# Patient Record
Sex: FEMALE | Race: WHITE | NOT HISPANIC OR LATINO | Employment: OTHER | URBAN - METROPOLITAN AREA
[De-identification: names, ages, dates, MRNs, and addresses within clinical notes are randomized per-mention and may not be internally consistent; named-entity substitution may affect disease eponyms.]

---

## 2018-07-18 ENCOUNTER — OFFICE VISIT CONVERTED (OUTPATIENT)
Dept: ORTHOPEDIC SURGERY | Facility: CLINIC | Age: 78
End: 2018-07-18
Attending: ORTHOPAEDIC SURGERY

## 2019-01-14 ENCOUNTER — OFFICE VISIT CONVERTED (OUTPATIENT)
Dept: ORTHOPEDIC SURGERY | Facility: CLINIC | Age: 79
End: 2019-01-14
Attending: ORTHOPAEDIC SURGERY

## 2019-02-04 ENCOUNTER — OFFICE VISIT CONVERTED (OUTPATIENT)
Dept: ORTHOPEDIC SURGERY | Facility: CLINIC | Age: 79
End: 2019-02-04
Attending: PHYSICIAN ASSISTANT

## 2019-03-12 ENCOUNTER — HOSPITAL ENCOUNTER (OUTPATIENT)
Dept: PREADMISSION TESTING | Facility: HOSPITAL | Age: 79
Discharge: HOME | End: 2019-03-12
Attending: ORTHOPAEDIC SURGERY

## 2019-03-12 LAB
ANION GAP SERPL CALC-SCNC: 14 MMOL/L (ref 8–19)
APTT BLD: 22.7 S (ref 22.2–34.2)
BASOPHILS # BLD AUTO: 0.09 10*3/UL (ref 0–0.2)
BASOPHILS NFR BLD AUTO: 0.9 % (ref 0–3)
BUN SERPL-MCNC: 23 MG/DL (ref 5–25)
BUN/CREAT SERPL: 24 {RATIO} (ref 6–20)
CALCIUM SERPL-MCNC: 10.2 MG/DL (ref 8.7–10.4)
CHLORIDE SERPL-SCNC: 100 MMOL/L (ref 99–111)
CONV ABS IMM GRAN: 0.05 10*3/UL (ref 0–0.2)
CONV CO2: 29 MMOL/L (ref 22–32)
CONV IMMATURE GRAN: 0.5 % (ref 0–1.8)
CREAT UR-MCNC: 0.95 MG/DL (ref 0.5–0.9)
DEPRECATED RDW RBC AUTO: 51.7 FL (ref 36.4–46.3)
EOSINOPHIL # BLD AUTO: 0.13 10*3/UL (ref 0–0.7)
EOSINOPHIL # BLD AUTO: 1.2 % (ref 0–7)
ERYTHROCYTE [DISTWIDTH] IN BLOOD BY AUTOMATED COUNT: 14.6 % (ref 11.7–14.4)
GFR SERPLBLD BASED ON 1.73 SQ M-ARVRAT: 57 ML/MIN/{1.73_M2}
GLUCOSE SERPL-MCNC: 85 MG/DL (ref 65–99)
HBA1C MFR BLD: 13.1 G/DL (ref 12–16)
HCT VFR BLD AUTO: 43 % (ref 37–47)
INR PPP: 0.9 (ref 2–3)
LYMPHOCYTES # BLD AUTO: 1.86 10*3/UL (ref 1–5)
MCH RBC QN AUTO: 29.1 PG (ref 27–31)
MCHC RBC AUTO-ENTMCNC: 30.5 G/DL (ref 33–37)
MCV RBC AUTO: 95.6 FL (ref 81–99)
MONOCYTES # BLD AUTO: 0.69 10*3/UL (ref 0.2–1.2)
MONOCYTES NFR BLD AUTO: 6.6 % (ref 3–10)
NEUTROPHILS # BLD AUTO: 7.69 10*3/UL (ref 2–8)
NEUTROPHILS NFR BLD AUTO: 73.1 % (ref 30–85)
NRBC CBCN: 0 % (ref 0–0.7)
OSMOLALITY SERPL CALC.SUM OF ELEC: 291 MOSM/KG (ref 273–304)
PLATELET # BLD AUTO: 362 10*3/UL (ref 130–400)
PMV BLD AUTO: 10.1 FL (ref 9.4–12.3)
POTASSIUM SERPL-SCNC: 4.4 MMOL/L (ref 3.5–5.3)
PROTHROMBIN TIME: 9.6 S (ref 9.4–12)
RBC # BLD AUTO: 4.5 10*6/UL (ref 4.2–5.4)
SODIUM SERPL-SCNC: 139 MMOL/L (ref 135–147)
VARIANT LYMPHS NFR BLD MANUAL: 17.7 % (ref 20–45)
WBC # BLD AUTO: 10.51 10*3/UL (ref 4.8–10.8)

## 2019-04-08 ENCOUNTER — CONVERSION ENCOUNTER (OUTPATIENT)
Dept: ORTHOPEDIC SURGERY | Facility: CLINIC | Age: 79
End: 2019-04-08

## 2019-04-08 ENCOUNTER — OFFICE VISIT CONVERTED (OUTPATIENT)
Dept: ORTHOPEDIC SURGERY | Facility: CLINIC | Age: 79
End: 2019-04-08
Attending: PHYSICIAN ASSISTANT

## 2019-05-06 ENCOUNTER — CONVERSION ENCOUNTER (OUTPATIENT)
Dept: ORTHOPEDIC SURGERY | Facility: CLINIC | Age: 79
End: 2019-05-06

## 2019-05-06 ENCOUNTER — OFFICE VISIT CONVERTED (OUTPATIENT)
Dept: ORTHOPEDIC SURGERY | Facility: CLINIC | Age: 79
End: 2019-05-06
Attending: PHYSICIAN ASSISTANT

## 2019-05-06 ENCOUNTER — HOSPITAL ENCOUNTER (OUTPATIENT)
Dept: CARDIOLOGY | Facility: HOSPITAL | Age: 79
Discharge: HOME | End: 2019-05-06
Attending: PHYSICIAN ASSISTANT

## 2019-06-10 ENCOUNTER — OFFICE VISIT CONVERTED (OUTPATIENT)
Dept: ORTHOPEDIC SURGERY | Facility: CLINIC | Age: 79
End: 2019-06-10
Attending: PHYSICIAN ASSISTANT

## 2019-07-16 ENCOUNTER — HOSPITAL ENCOUNTER (OUTPATIENT)
Dept: MAMMOGRAPHY | Facility: HOSPITAL | Age: 79
Discharge: HOME | End: 2019-07-16

## 2019-09-16 ENCOUNTER — OFFICE VISIT CONVERTED (OUTPATIENT)
Dept: ORTHOPEDIC SURGERY | Facility: CLINIC | Age: 79
End: 2019-09-16
Attending: ORTHOPAEDIC SURGERY

## 2020-09-14 ENCOUNTER — OFFICE VISIT CONVERTED (OUTPATIENT)
Dept: ORTHOPEDIC SURGERY | Facility: CLINIC | Age: 80
End: 2020-09-14
Attending: PHYSICIAN ASSISTANT

## 2020-09-22 ENCOUNTER — HOSPITAL ENCOUNTER (OUTPATIENT)
Dept: OTHER | Facility: HOSPITAL | Age: 80
Discharge: HOME | End: 2020-09-22
Attending: PHYSICIAN ASSISTANT

## 2020-11-18 ENCOUNTER — OFFICE VISIT CONVERTED (OUTPATIENT)
Dept: ORTHOPEDIC SURGERY | Facility: CLINIC | Age: 80
End: 2020-11-18
Attending: ORTHOPAEDIC SURGERY

## 2020-11-18 ENCOUNTER — CONVERSION ENCOUNTER (OUTPATIENT)
Dept: ORTHOPEDIC SURGERY | Facility: CLINIC | Age: 80
End: 2020-11-18

## 2021-02-03 ENCOUNTER — OFFICE VISIT CONVERTED (OUTPATIENT)
Dept: ORTHOPEDIC SURGERY | Facility: CLINIC | Age: 81
End: 2021-02-03
Attending: PHYSICIAN ASSISTANT

## 2021-02-05 ENCOUNTER — HOSPITAL ENCOUNTER (OUTPATIENT)
Dept: OTHER | Facility: HOSPITAL | Age: 81
Discharge: HOME | End: 2021-02-05
Attending: PHYSICIAN ASSISTANT

## 2021-02-22 ENCOUNTER — HOSPITAL ENCOUNTER (OUTPATIENT)
Dept: VACCINE CLINIC | Facility: HOSPITAL | Age: 81
Discharge: HOME | End: 2021-02-22
Attending: INTERNAL MEDICINE

## 2021-03-03 ENCOUNTER — OFFICE VISIT CONVERTED (OUTPATIENT)
Dept: ORTHOPEDIC SURGERY | Facility: CLINIC | Age: 81
End: 2021-03-03
Attending: ORTHOPAEDIC SURGERY

## 2021-03-08 ENCOUNTER — HOSPITAL ENCOUNTER (OUTPATIENT)
Dept: PREADMISSION TESTING | Facility: HOSPITAL | Age: 81
Discharge: HOME | End: 2021-03-08
Attending: ORTHOPAEDIC SURGERY

## 2021-03-08 LAB
ALBUMIN SERPL-MCNC: 3.8 G/DL (ref 3.5–5)
ALBUMIN/GLOB SERPL: 1.5 {RATIO} (ref 1.4–2.6)
ALP SERPL-CCNC: 73 U/L (ref 43–160)
ALT SERPL-CCNC: 10 U/L (ref 10–40)
ANION GAP SERPL CALC-SCNC: 12 MMOL/L (ref 8–19)
APTT BLD: 22.3 S (ref 22.2–34.2)
AST SERPL-CCNC: 15 U/L (ref 15–50)
BASOPHILS # BLD AUTO: 0.08 10*3/UL (ref 0–0.2)
BASOPHILS NFR BLD AUTO: 1 % (ref 0–3)
BILIRUB SERPL-MCNC: 0.17 MG/DL (ref 0.2–1.3)
BUN SERPL-MCNC: 15 MG/DL (ref 5–25)
BUN/CREAT SERPL: 19 {RATIO} (ref 6–20)
CALCIUM SERPL-MCNC: 9.8 MG/DL (ref 8.7–10.4)
CHLORIDE SERPL-SCNC: 101 MMOL/L (ref 99–111)
CONV ABS IMM GRAN: 0.08 10*3/UL (ref 0–0.2)
CONV CO2: 29 MMOL/L (ref 22–32)
CONV IMMATURE GRAN: 1 % (ref 0–1.8)
CONV TOTAL PROTEIN: 6.3 G/DL (ref 6.3–8.2)
CREAT UR-MCNC: 0.8 MG/DL (ref 0.5–0.9)
DEPRECATED RDW RBC AUTO: 46.9 FL (ref 36.4–46.3)
EOSINOPHIL # BLD AUTO: 0.2 10*3/UL (ref 0–0.7)
EOSINOPHIL # BLD AUTO: 2.5 % (ref 0–7)
ERYTHROCYTE [DISTWIDTH] IN BLOOD BY AUTOMATED COUNT: 13.8 % (ref 11.7–14.4)
EST. AVERAGE GLUCOSE BLD GHB EST-MCNC: 105 MG/DL
GFR SERPLBLD BASED ON 1.73 SQ M-ARVRAT: >60 ML/MIN/{1.73_M2}
GLOBULIN UR ELPH-MCNC: 2.5 G/DL (ref 2–3.5)
GLUCOSE SERPL-MCNC: 105 MG/DL (ref 65–99)
HBA1C MFR BLD: 5.3 % (ref 3.5–5.7)
HCT VFR BLD AUTO: 39.7 % (ref 37–47)
HGB BLD-MCNC: 12.5 G/DL (ref 12–16)
INR PPP: 0.89 (ref 2–3)
LYMPHOCYTES # BLD AUTO: 1.94 10*3/UL (ref 1–5)
LYMPHOCYTES NFR BLD AUTO: 23.8 % (ref 20–45)
MCH RBC QN AUTO: 28.9 PG (ref 27–31)
MCHC RBC AUTO-ENTMCNC: 31.5 G/DL (ref 33–37)
MCV RBC AUTO: 91.7 FL (ref 81–99)
MONOCYTES # BLD AUTO: 0.42 10*3/UL (ref 0.2–1.2)
MONOCYTES NFR BLD AUTO: 5.1 % (ref 3–10)
NEUTROPHILS # BLD AUTO: 5.44 10*3/UL (ref 2–8)
NEUTROPHILS NFR BLD AUTO: 66.6 % (ref 30–85)
NRBC CBCN: 0 % (ref 0–0.7)
OSMOLALITY SERPL CALC.SUM OF ELEC: 287 MOSM/KG (ref 273–304)
PLATELET # BLD AUTO: 302 10*3/UL (ref 130–400)
PMV BLD AUTO: 9.8 FL (ref 9.4–12.3)
POTASSIUM SERPL-SCNC: 3.9 MMOL/L (ref 3.5–5.3)
PROTHROMBIN TIME: 10 S (ref 9.4–12)
RBC # BLD AUTO: 4.33 10*6/UL (ref 4.2–5.4)
SODIUM SERPL-SCNC: 138 MMOL/L (ref 135–147)
WBC # BLD AUTO: 8.16 10*3/UL (ref 4.8–10.8)

## 2021-03-25 ENCOUNTER — HOSPITAL ENCOUNTER (OUTPATIENT)
Dept: VACCINE CLINIC | Facility: HOSPITAL | Age: 81
Discharge: HOME | End: 2021-03-25
Attending: INTERNAL MEDICINE

## 2021-03-25 ENCOUNTER — HOSPITAL ENCOUNTER (OUTPATIENT)
Dept: PREADMISSION TESTING | Facility: HOSPITAL | Age: 81
Discharge: HOME | End: 2021-03-25
Attending: ORTHOPAEDIC SURGERY

## 2021-03-26 LAB — SARS-COV-2 RNA SPEC QL NAA+PROBE: NOT DETECTED

## 2021-04-12 ENCOUNTER — OFFICE VISIT CONVERTED (OUTPATIENT)
Dept: ORTHOPEDIC SURGERY | Facility: CLINIC | Age: 81
End: 2021-04-12

## 2021-04-12 ENCOUNTER — CONVERSION ENCOUNTER (OUTPATIENT)
Dept: ORTHOPEDIC SURGERY | Facility: CLINIC | Age: 81
End: 2021-04-12

## 2021-05-13 NOTE — PROGRESS NOTES
Progress Note      Patient Name: Jayne Crenshaw   Patient ID: [id number]   Sex: Female   YOB: 1940    Referring Provider: Willow Mcknight MD    Visit Date: November 18, 2020    Provider: Marco Antonio Tabares MD   Location: [facility]   Location Address: [address] [place] [address]  [id number]   Location Phone: [phone]          Chief Complaint  · left hip pain      History Of Present Illness  Jayne Crenshaw is a 80 year old /White female who presents today to [facility].      Patient presents today with a follow-up of left hip pain. Patient has a history of right DELVIS 3/26/19 by Dr. Tabares and has no new complaints of her right hip. She has a history of left hip osteoarthritis. Patient got an injection through radiology on 9/22/20 in her left hip. Patient states the injection has given her a significant relief of pain. Patient states that on a scale of 1 to 10, her hip pain is at a 2. She states her hip hasn't been bothering her much since the injection.       Past Medical History  Anemia; Anemia, Unspecified; Arthritis; Bladder Disorder; Depression; High cholesterol; Hyperlipemia; Hypertension; Limb Swelling; Seasonal allergies         Past Surgical History  Appendectomy; Artificial Joints/Limbs; Colonoscopy; Hysterectomy; Joint Surgery; Oral Surgery; Tonsillectomy; Tubal ligation         Medication List  Calcium 600 600 mg (1,500 mg) oral tablet; Desenex 2 % topical powder; fluconazole 200 mg oral tablet; Fosamax 70 mg oral tablet; Lopressor 50 mg oral tablet; Miralax 17 gram/dose oral powder; Norvasc 10 mg oral tablet; Percocet 7.5-325 mg oral tablet; pravastatin 20 mg oral tablet; Zofran 4 mg oral tablet         Allergy List  Biaxin; propofol       Allergies Reconciled  Family Medical History  Heart Disease; Cancer, Unspecified; Diabetes, unspecified type; - No Family History of Colorectal Cancer; Family history of certain chronic disabling  "diseases; arthritis; Family history of Arthritis         Social History  Alcohol (Never); Alcohol Use (Never); Claustophobic (Unknown); lives alone; Recreational Drug Use (Never); Retired.; Tobacco (Never);          Review of Systems  · Constitutional  o Denies  o : fever, chills, weight loss  · Cardiovascular  o Denies  o : chest pain, shortness of breath  · Gastrointestinal  o Denies  o : liver disease, heartburn, nausea, blood in stools  · Genitourinary  o Denies  o : painful urination, blood in urine  · Integument  o Denies  o : rash, itching  · Neurologic  o Denies  o : headache, weakness, loss of consciousness  · Musculoskeletal  o Denies  o : painful, swollen joints  · Psychiatric  o Denies  o : drug/alcohol addiction, anxiety, depression      Vitals  Date Time BP Position Site L\R Cuff Size HR RR TEMP (F) WT  HT  BMI kg/m2 BSA m2 O2 Sat FR L/min FiO2        11/18/2020 09:29 AM         160lbs 0oz 5'  4\" 27.46 1.81             Physical Examination  · Constitutional  o Appearance  o : well developed, well-nourished, no obvious deformities present  · Head and Face  o Head  o :   § Inspection  § : normocephalic  o Face  o :   § Inspection  § : no facial lesions  · Eyes  o Conjunctivae  o : conjunctivae normal  o Sclerae  o : sclerae white  · Ears, Nose, Mouth and Throat  o Ears  o :   § External Ears  § : appearance within normal limits  § Hearing  § : intact  o Nose  o :   § External Nose  § : appearance normal  · Neck  o Inspection/Palpation  o : normal appearance  o Range of Motion  o : full range of motion  · Respiratory  o Respiratory Effort  o : breathing unlabored  o Inspection of Chest  o : normal appearance  o Auscultation of Lungs  o : no audible wheezing or rales  · Cardiovascular  o Heart  o : regular rate  · Gastrointestinal  o Abdominal Examination  o : soft and non-tender  · Skin and Subcutaneous Tissue  o General Inspection  o : intact, no rashes  · Psychiatric  o General  o : Alert and " oriented x3  o Judgement and Insight  o : judgment and insight intact  o Mood and Affect  o : mood normal, affect appropriate  · Left Hip  o Inspection  o : Sensation grossly intact. Neurovascular intact. No obvious deformity. Tender greater trochanter. Good strength in quadriceps, hamstrings, dorsiflexors, and plantar flexors. Skin intact. No swelling or skin discoloration. Near full ROM of hip.   · In Office Procedures  o Comparative Data  o : No comparative data found  · Imaging  o Imaging  o : 9/14/20 In office XRAY: severe advanced degenerative changes of the left hip. bone on bone osteoarthritis.               Assessment  · Primary osteoarthritis of left hip     715.15/M16.10  · Chronic Left Pain: Hip     719.45/M25.559      Plan  · Medications  o Medications have been Reconciled  o Transition of Care or Provider Policy  · Instructions  o Dr. Tabares saw and examined the patient and agrees with plan.   o X-rays reviewed by Dr. Tabares.  o Reviewed the patient's Past Medical, Social, and Family history as well as the ROS at today's visit, no changes.  o Call or return if worsening symptoms.  o Follow Up PRN.  o This note was transcribed by Darlene Baltazar. gordon  o Discussed diagnosis and treatment options with the patient. Patient opted to put off left total hip replacement for as long as she can.            Electronically Signed by: Darlene Baltazar-, Other -Author on November 19, 2020 03:01:51 PM  Electronically Co-signed by: Marco Antonio Tabares MD -Reviewer on November 20, 2020 10:43:51 PM

## 2021-05-13 NOTE — PROGRESS NOTES
Progress Note      Patient Name: Jayne Crenshaw   Patient ID: [id number]   Sex: Female   YOB: 1940    Referring Provider: Willow Mcknight MD    Visit Date: September 14, 2020    Provider: Natalia Sheridan PA-C   Location: [facility]   Location Address: [address] [place] [address]  [id number]   Location Phone: [phone]          Chief Complaint  · right hip pain  · left hip pain      History Of Present Illness  Jayne Crenshaw is a 80 year old /White female who presents today to [facility].      She is s/p right DELVIS 3/26/19 by Dr. Tabares. She is doing well. She has no complaints of right hip pain. She does states left leg symptoms including tightness in her calf and knee discomfort that are reminiscent of right hip symptoms prior to right DELVIS. She was told she had left hip OA in the past.    She also states bilateral thumb pain and numbness of first through fourth digits bilateral hands that wakes her at night.       Past Medical History  Anemia; Anemia, Unspecified; Arthritis; Bladder Disorder; Depression; High cholesterol; Hyperlipemia; Hypertension; Limb Swelling; Seasonal allergies         Past Surgical History  Appendectomy; Artificial Joints/Limbs; Colonoscopy; Hysterectomy; Joint Surgery; Oral Surgery; Tonsillectomy; Tubal ligation         Medication List  Calcium 600 600 mg (1,500 mg) oral tablet; Desenex 2 % topical powder; fluconazole 200 mg oral tablet; Fosamax 70 mg oral tablet; Lopressor 50 mg oral tablet; Miralax 17 gram/dose oral powder; Norvasc 10 mg oral tablet; Percocet 7.5-325 mg oral tablet; pravastatin 20 mg oral tablet; Zofran 4 mg oral tablet         Allergy List  Biaxin; propofol         Family Medical History  Heart Disease; Cancer, Unspecified; Diabetes, unspecified type; - No Family History of Colorectal Cancer; Family history of certain chronic disabling diseases; arthritis; Family history of Arthritis         Social  "History  Alcohol (Never); lives alone; Recreational Drug Use (Never); Retired.; Tobacco (Never);          Review of Systems  · Constitutional  o Denies  o : fever, chills, weight loss  · Cardiovascular  o Denies  o : chest pain, shortness of breath  · Gastrointestinal  o Denies  o : liver disease, heartburn, nausea, blood in stools  · Genitourinary  o Denies  o : painful urination, blood in urine  · Integument  o Denies  o : rash, itching  · Neurologic  o Denies  o : headache, weakness, loss of consciousness  · Musculoskeletal  o Admits  o : painful, swollen joints  · Psychiatric  o Denies  o : drug/alcohol addiction, anxiety, depression      Vitals  Date Time BP Position Site L\R Cuff Size HR RR TEMP (F) WT  HT  BMI kg/m2 BSA m2 O2 Sat        09/14/2020 08:41 AM      71 - R   162lbs 8oz 5'  4\" 27.89 1.82 96 %          Physical Examination  · Constitutional  o Appearance  o : well developed, well-nourished, no obvious deformities present  · Head and Face  o Head  o :   § Inspection  § : normocephalic  o Face  o :   § Inspection  § : no facial lesions  · Eyes  o Conjunctivae  o : conjunctivae normal  o Sclerae  o : sclerae white  · Ears, Nose, Mouth and Throat  o Ears  o :   § External Ears  § : appearance within normal limits  § Hearing  § : intact  o Nose  o :   § External Nose  § : appearance normal  · Neck  o Inspection/Palpation  o : normal appearance  o Range of Motion  o : full range of motion  · Respiratory  o Respiratory Effort  o : breathing unlabored  o Inspection of Chest  o : normal appearance  o Auscultation of Lungs  o : no audible wheezing or rales  · Cardiovascular  o Heart  o : regular rate  · Gastrointestinal  o Abdominal Examination  o : soft and non-tender  · Skin and Subcutaneous Tissue  o General Inspection  o : intact, no rashes  · Psychiatric  o General  o : Alert and oriented x3  o Judgement and Insight  o : judgment and insight intact  o Mood and Affect  o : mood normal, affect " appropriate  · Right Hip  o Inspection  o : Her scar is well-healed. Full hip flexion, internal and external rotation without groin. Calf supple, nontender. Equal strength bilaterally. Neurovascularly and sensation grossly intact.   · Left Hip  o Inspection  o : No obvious deformity. Tender greater trochanter. Full ROM. + pain with figure of 4. Strength equal bilaterally. Neurovascularly intact.   · Extremities  o Extremities  o : BIALTERAL HANDS: mild thenar atrophy, enlarged bilateral first CMC joints. Tender over first dorsal compartment bilaterally. + Finkelstein's bilaterally. Negative CMC grind test bilateral first digit. Negative Tinel's. + Phalen's at bilateral carpal tunnel.   · In Office Procedures  o View  o : AP/LATERAL  o Site  o : right, hip, left hip  o Indication  o : Right hip pain, left hip pain  o Study  o : X-rays ordered, taken in the office, and reviewed today.  o Xray  o : Well aligned, stable implant without signs of loosening. Bone on bone left hip OA.  o Comparative Data  o : Comparative Data found and reviewed today           Assessment  · Primary osteoarthritis of left hip     715.15/M16.12  · Carpal Tunnel Syndrome     354.0/G56.00  · DeQuervains     727.04/M65.4  · Right Pain: Hip     719.45/M25.559  · History of hip replacement, total, right     V43.64/Z96.641      Plan  · Orders  o Hip (Left) 2 or more views (includes AP Pelvis) [facility] Preferred View (17254) - - 09/14/2020  o Hip (Right) 2 or more views (includes AP Pelvis) [facility] Preferred View. (21454) - 719.45/M25.559 - 09/14/2020  · Medications  o Medications have been Reconciled  o Transition of Care or Provider Policy  · Instructions  o Dr. Tabares saw and examined the patient and agrees with plan.   o X-rays reviewed by Dr. Tabares.  o Reviewed the patient's Past Medical, Social, and Family history as well as the ROS at today's visit, no changes.  o Call or return if worsening symptoms.  o Carpal tunnel braces provided. Will proceed with  left hip intra-articular injection, but she will also schedule left DELVIS for January.   o Electronically Identified Patient Education Materials Provided Electronically            Electronically Signed by: TENNILLE Gutierrez-MONICA -Author on September 14, 2020 11:30:55 AM  Electronically Co-signed by: Marco Antonio Tabares MD -Reviewer on September 14, 2020 05:31:53 PM

## 2021-05-14 VITALS — HEART RATE: 68 BPM | OXYGEN SATURATION: 97 % | BODY MASS INDEX: 27.31 KG/M2 | HEIGHT: 64 IN | WEIGHT: 160 LBS

## 2021-05-14 VITALS — WEIGHT: 160 LBS | BODY MASS INDEX: 27.31 KG/M2 | OXYGEN SATURATION: 98 % | HEIGHT: 64 IN | HEART RATE: 70 BPM

## 2021-05-14 VITALS — HEIGHT: 64 IN | WEIGHT: 160 LBS | BODY MASS INDEX: 27.31 KG/M2

## 2021-05-14 VITALS — HEIGHT: 62 IN | HEART RATE: 71 BPM | OXYGEN SATURATION: 95 % | BODY MASS INDEX: 31.74 KG/M2 | WEIGHT: 172.5 LBS

## 2021-05-14 VITALS — HEART RATE: 71 BPM | WEIGHT: 162.5 LBS | BODY MASS INDEX: 27.74 KG/M2 | HEIGHT: 64 IN | OXYGEN SATURATION: 96 %

## 2021-05-14 NOTE — PROGRESS NOTES
Progress Note      Patient Name: Jayne Crenshaw   Patient ID: [id number]   Sex: Female   YOB: 1940    Referring Provider: Willow Mcknight MD    Visit Date: February 3, 2021    Provider: Natalia Sheridan PA-C   Location: [facility]   Location Address: [address] [place] [address]  [id number]   Location Phone: [phone]          Chief Complaint  · Follow up left hip pain      History Of Present Illness  Jayne Crenshaw is a 80 year old /White female who presents today to [facility].      She is here for her left hip. She has bone on bone arthritis. She plans to eventually get her hip replaced, but is holding off to get vaccinated for COVID. She had 3 months of relief with intra-articular injection last year and requests this again.       Past Medical History  Anemia; Anemia, Unspecified; Arthritis; Bladder Disorder; Depression; High cholesterol; Hyperlipemia; Hypertension; Limb Swelling; Seasonal allergies         Past Surgical History  Appendectomy; Artificial Joints/Limbs; Colonoscopy; Hysterectomy; Joint Surgery; Oral Surgery; Tonsillectomy; Tubal ligation         Medication List  Calcium 600 600 mg (1,500 mg) oral tablet; Desenex 2 % topical powder; fluconazole 200 mg oral tablet; Fosamax 70 mg oral tablet; Lopressor 50 mg oral tablet; Miralax 17 gram/dose oral powder; Norvasc 10 mg oral tablet; Percocet 7.5-325 mg oral tablet; pravastatin 20 mg oral tablet; Zofran 4 mg oral tablet         Allergy List  Biaxin; propofol       Allergies Reconciled  Family Medical History  Heart Disease; Cancer, Unspecified; Diabetes, unspecified type; - No Family History of Colorectal Cancer; Family history of certain chronic disabling diseases; arthritis; Family history of Arthritis         Social History  Alcohol (Never); Alcohol Use (Never); Claustophobic (Unknown); lives alone; Recreational Drug Use (Never); Retired.; Tobacco (Never);          Review of  "Systems  · Constitutional  o Denies  o : fever, chills, weight loss  · Cardiovascular  o Denies  o : chest pain, shortness of breath  · Gastrointestinal  o Denies  o : liver disease, heartburn, nausea, blood in stools  · Genitourinary  o Denies  o : painful urination, blood in urine  · Integument  o Denies  o : rash, itching  · Neurologic  o Denies  o : headache, weakness, loss of consciousness  · Musculoskeletal  o Admits  o : painful, swollen joints  · Psychiatric  o Denies  o : drug/alcohol addiction, anxiety, depression      Vitals  Date Time BP Position Site L\R Cuff Size HR RR TEMP (F) WT  HT  BMI kg/m2 BSA m2 O2 Sat FR L/min FiO2 HC       02/03/2021 10:17 AM      70 - R   160lbs 0oz 5'  4\" 27.46 1.81 98 %            Physical Examination  · Constitutional  o Appearance  o : well developed, well-nourished, no obvious deformities present  · Head and Face  o Head  o :   § Inspection  § : normocephalic  o Face  o :   § Inspection  § : no facial lesions  · Eyes  o Conjunctivae  o : conjunctivae normal  o Sclerae  o : sclerae white  · Ears, Nose, Mouth and Throat  o Ears  o :   § External Ears  § : appearance within normal limits  § Hearing  § : intact  o Nose  o :   § External Nose  § : appearance normal  · Neck  o Inspection/Palpation  o : normal appearance  o Range of Motion  o : full range of motion  · Respiratory  o Respiratory Effort  o : breathing unlabored  o Inspection of Chest  o : normal appearance  o Auscultation of Lungs  o : no audible wheezing or rales  · Cardiovascular  o Heart  o : regular rate  · Gastrointestinal  o Abdominal Examination  o : soft and non-tender  · Skin and Subcutaneous Tissue  o General Inspection  o : intact, no rashes  · Psychiatric  o General  o : Alert and oriented x3  o Judgement and Insight  o : judgment and insight intact  o Mood and Affect  o : mood normal, affect appropriate  · Left Hip  o Inspection  o : Sensation grossly intact. Neurovascular intact. No obvious " deformity. Tender greater trochanter. Good strength in quadriceps, hamstrings, dorsiflexors, and plantar flexors. Skin intact. No swelling or skin discoloration. Near full ROM of hip.           Assessment  · Primary osteoarthritis of hip     715.15/M16.10  · Left Pain: Hip     719.45/M25.559      Plan  · Medications  o Medications have been Reconciled  o Transition of Care or Provider Policy  · Instructions  o Reviewed the patient's Past Medical, Social, and Family history as well as the ROS at today's visit, no changes.  o Call or return if worsening symptoms.  o Proceed with left hip intra-articular injection. She will schedule hip replacement no less than 8 weeks out. Follow up 4-6 weeks with Dr. Tabares to discuss anesthesia concerns.   o Electronically Identified Patient Education Materials Provided Electronically            Electronically Signed by: TENNILLE Gutierrez-C -Author on February 3, 2021 12:30:58 PM  Electronically Co-signed by: Marco Antonio Tabares MD -Reviewer on February 4, 2021 08:33:18 PM

## 2021-05-14 NOTE — PROGRESS NOTES
Progress Note      Patient Name: Jayne Crenshaw   Patient ID: [id number]   Sex: Female   YOB: 1940    Referring Provider: Willow Mcknight MD    Visit Date: April 12, 2021    Provider: Jaiden Ryder PA-C   Location: [facility]   Location Address: [address] [place] [address]  [id number]   Location Phone: [phone]          Chief Complaint  · left hip pain      History Of Present Illness  Jayne Crenshaw is a 81 year old /White female who presents today to [facility].      Patient is s/p left total hip arthroplasty performed on 3/30/2021 by Dr. Tabares.  Patient is doing well but has multiple questions today, particularly revolving around her activities and swelling about the left lower extremity.       Past Medical History  Anemia; Anemia, Unspecified; Arthritis; Bladder disorder; Depression; High cholesterol; Hyperlipemia; Hypertension; Limb Swelling; Seasonal allergies         Past Surgical History  Appendectomy; Artificial Joints/Limbs; Colonoscopy; Hysterectomy; Joint Surgery; Oral Surgery; Tonsillectomy; Tubal ligation         Medication List  Calcium 600 600 mg (1,500 mg) oral tablet; fluconazole 200 mg oral tablet; Fosamax 70 mg oral tablet; Lopressor 50 mg oral tablet; Norvasc 10 mg oral tablet; pravastatin 20 mg oral tablet         Allergy List  Biaxin; propofol       Allergies Reconciled  Family Medical History  Heart Disease; Cancer, Unspecified; Diabetes, unspecified type; - No Family History of Colorectal Cancer; Family history of certain chronic disabling diseases; arthritis; Family history of Arthritis         Social History  Alcohol (Never); Alcohol Use (Never); Claustophobic (Unknown); lives alone; Recreational Drug Use (Never); Retired.; Tobacco (Never);          Review of Systems  · Constitutional  o Denies  o : fever, chills, weight loss  · Cardiovascular  o Denies  o : chest pain, shortness of  "breath  · Gastrointestinal  o Denies  o : liver disease, heartburn, nausea, blood in stools  · Genitourinary  o Denies  o : painful urination, blood in urine  · Integument  o Denies  o : rash, itching  · Neurologic  o Denies  o : headache, weakness, loss of consciousness  · Musculoskeletal  o Denies  o : painful, swollen joints  · Psychiatric  o Denies  o : drug/alcohol addiction, anxiety, depression      Vitals  Date Time BP Position Site L\R Cuff Size HR RR TEMP (F) WT  HT  BMI kg/m2 BSA m2 O2 Sat FR L/min FiO2 HC       04/12/2021 01:22 PM      71 - R   172lbs 8oz 5'  2\" 31.55 1.85 95 %            Physical Examination  · Constitutional  o Appearance  o : well developed, well-nourished, no obvious deformities present  · Head and Face  o Head  o :   § Inspection  § : normocephalic  o Face  o :   § Inspection  § : no facial lesions  · Eyes  o Conjunctivae  o : conjunctivae normal  o Sclerae  o : sclerae white  · Ears, Nose, Mouth and Throat  o Ears  o :   § External Ears  § : appearance within normal limits  § Hearing  § : intact  o Nose  o :   § External Nose  § : appearance normal  · Neck  o Inspection/Palpation  o : normal appearance  o Range of Motion  o : full range of motion  · Respiratory  o Respiratory Effort  o : breathing unlabored  o Inspection of Chest  o : normal appearance  o Auscultation of Lungs  o : no audible wheezing or rales  · Cardiovascular  o Heart  o : regular rate  · Gastrointestinal  o Abdominal Examination  o : soft and non-tender  · Skin and Subcutaneous Tissue  o General Inspection  o : intact, no rashes  · Psychiatric  o General  o : Alert and oriented x3  o Judgement and Insight  o : judgment and insight intact  o Mood and Affect  o : mood normal, affect appropriate  · Left Hip  o Inspection  o : Surgical site is clean, dry and intact and be appreciated to be healing appropriately. Left lower extremity demonstrates 3+ edema about the foot and ankle. Otherwise neurovascularly " intact.  · In Office Procedures  o View  o : AP/LATERAL  o Site  o : left, hip  o Indication  o : Left hip pain  o Study  o : X-rays ordered, taken in the office, and reviewed today.  o Xray  o : Demonstrates post-surgical changes of DELVIS with no appreciable loosening or displaced hardware. All other alignments are appreciated via anatomic.   o Comparative Data  o : Comparative Data found and reviewed today          Assessment  · Aftercare;following left total hip arthroplasty     V54.81/Z47.1  · Left Pain: Hip     719.45/M25.559  · Edema of lower extremity     782.3/R60.9      Plan  · Orders  o Hip (Left) 2 or more views (includes AP Pelvis) [facility] Preferred View (25409) - 719.45/M25.559 - 04/12/2021  · Medications  o Medications have been Reconciled  o Transition of Care or Provider Policy  · Instructions  o Reviewed the patient's Past Medical, Social, and Family history as well as the ROS at today's visit, no changes.  o Call or return if worsening symptoms.  o X-ray ordered, taken and reviewed at this visit.  o Reviewed Xrays.  o Follow Up in 4 weeks.   o This note was transcribed by Darlene Baltazar. ch  o Patient is to continue with physical therapy and a new physical therapy order is given today. Patient is advised RICE therapy. Patient is also advised on returning to driving and to the precautions regarding this including her responsibility to operate a vehicle to the standards expected. Patient to follow-up in 4 weeks for further evaluation. Patient understands and agrees with this plan.             Electronically Signed by: Darlene Baltazar-, Other -Author on April 15, 2021 02:06:26 PM  Electronically Co-signed by: Jaiden Ryder PA-C -Reviewer on April 15, 2021 05:41:17 PM  Electronically Co-signed by: Marco Antonio Tabares MD -Reviewer on April 18, 2021 07:58:46 PM

## 2021-05-14 NOTE — PROGRESS NOTES
Progress Note      Patient Name: Jayne Crenshaw   Patient ID: [id number]   Sex: Female   YOB: 1940    Referring Provider: Willow Mcknight MD    Visit Date: March 3, 2021    Provider: Marco Antonio Tabares MD   Location: [facility]   Location Address: [address] [place] [address]  [id number]   Location Phone: [phone]          Chief Complaint  · Left Hip Osteoarthritis      History Of Present Illness  Jayne Crenshaw is a 81 year old /White female who presents today to [facility].      Patient presents today with a follow-up of left hip osteoarthritis. Patient has a history of bone on bone osteoarthritis. She has a history of a right total hip arthroplasty performed on 3/26/19. Patient is scheduled for a left total hip arthroplasty on 3/30/21. 2/5/21 patient received an intra-articular injection that has given her relief. She states some days she has no pain and other days she has a significant amount of left hip pain.       Past Medical History  Anemia; Anemia, Unspecified; Arthritis; Bladder disorder; Depression; High cholesterol; Hyperlipemia; Hypertension; Limb Swelling; Seasonal allergies         Past Surgical History  Appendectomy; Artificial Joints/Limbs; Colonoscopy; Hysterectomy; Joint Surgery; Oral Surgery; Tonsillectomy; Tubal ligation         Medication List  Calcium 600 600 mg (1,500 mg) oral tablet; fluconazole 200 mg oral tablet; Fosamax 70 mg oral tablet; Lopressor 50 mg oral tablet; Norvasc 10 mg oral tablet; pravastatin 20 mg oral tablet         Allergy List  Biaxin; propofol         Family Medical History  Heart Disease; Cancer, Unspecified; Diabetes, unspecified type; - No Family History of Colorectal Cancer; Family history of certain chronic disabling diseases; arthritis; Family history of Arthritis         Social History  Alcohol (Never); Alcohol Use (Never); Claustophobic (Unknown); lives alone; Recreational Drug Use (Never);  "Retired.; Tobacco (Never);          Review of Systems  · Constitutional  o Denies  o : fever, chills, weight loss  · Cardiovascular  o Denies  o : chest pain, shortness of breath  · Gastrointestinal  o Denies  o : liver disease, heartburn, nausea, blood in stools  · Genitourinary  o Denies  o : painful urination, blood in urine  · Integument  o Denies  o : rash, itching  · Neurologic  o Denies  o : headache, weakness, loss of consciousness  · Musculoskeletal  o Denies  o : painful, swollen joints  · Psychiatric  o Denies  o : drug/alcohol addiction, anxiety, depression      Vitals  Date Time BP Position Site L\R Cuff Size HR RR TEMP (F) WT  HT  BMI kg/m2 BSA m2 O2 Sat FR L/min FiO2 HC       03/03/2021 08:50 AM      68 - R   160lbs 0oz 5'  4\" 27.46 1.81 97 %            Physical Examination  · Constitutional  o Appearance  o : well developed, well-nourished, no obvious deformities present  · Head and Face  o Head  o :   § Inspection  § : normocephalic  o Face  o :   § Inspection  § : no facial lesions  · Eyes  o Conjunctivae  o : conjunctivae normal  o Sclerae  o : sclerae white  · Ears, Nose, Mouth and Throat  o Ears  o :   § External Ears  § : appearance within normal limits  § Hearing  § : intact  o Nose  o :   § External Nose  § : appearance normal  · Neck  o Inspection/Palpation  o : normal appearance  o Range of Motion  o : full range of motion  · Respiratory  o Respiratory Effort  o : breathing unlabored  o Inspection of Chest  o : normal appearance  o Auscultation of Lungs  o : no audible wheezing or rales  · Cardiovascular  o Heart  o : regular rate  · Gastrointestinal  o Abdominal Examination  o : soft and non-tender  · Skin and Subcutaneous Tissue  o General Inspection  o : intact, no rashes  · Psychiatric  o General  o : Alert and oriented x3  o Judgement and Insight  o : judgment and insight intact  o Mood and Affect  o : mood normal, affect appropriate  · Left Hip  o Inspection  o : Sensation " grossly intact. Neurovascular intact. Skin intact. No obvious deformity. Tender greater trochanteric. Good strength in quadriceps, hamstrings, dorsiflexors, and plantar flexors. Near full hip range of motion. Good tone of hip flexors, hip extensors, hip adductor, hip abductors. Tender pelvic and hip muscles. Crepitus. Cane for ambulation assistance.           Assessment  · Primary osteoarthritis of left hip     715.15/M16.12      Plan  · Medications  o Medications have been Reconciled  o Transition of Care or Provider Policy  · Instructions  o Dr. Tabares saw and examined the patient and agrees with plan.   o X-rays reviewed by Dr. Tabares.  o Reviewed the patient's Past Medical, Social, and Family history as well as the ROS at today's visit, no changes.  o Call or return if worsening symptoms.  o Discussed surgery.  o Risks/benefits discussed with patient including, but not limited to: infection, bleeding, neurovascular damage, malunion, nonunion, aesthetic deformity, need for further surgery, and death.  o Discussed with patient the implant type being used during surgery and patient understands and desires to proceed.  o Surgery pamphlet given.  o Follow Up post-op.  o Discussed treatment plans with the patient. Patient will continue with left total hip arthroplasty scheduled on 3/30/21.  o The above service was scribed by Darlene Baltazar on my behalf and I attest to the accuracy of the note. [email or web address]  o Electronically Identified Patient Education Materials Provided Electronically            Electronically Signed by: Darlene Baltazar-, Other -Author on March 4, 2021 03:30:38 PM  Electronically Co-signed by: Marco Antonio Tabares MD -Reviewer on March 7, 2021 09:44:01 AM

## 2021-05-15 VITALS — HEART RATE: 69 BPM | WEIGHT: 160 LBS | HEIGHT: 64 IN | OXYGEN SATURATION: 95 % | BODY MASS INDEX: 27.31 KG/M2

## 2021-05-15 VITALS — BODY MASS INDEX: 26.63 KG/M2 | HEIGHT: 64 IN | HEART RATE: 77 BPM | WEIGHT: 156 LBS | OXYGEN SATURATION: 97 %

## 2021-05-15 VITALS — BODY MASS INDEX: 28.21 KG/M2 | WEIGHT: 159.25 LBS | HEART RATE: 58 BPM | OXYGEN SATURATION: 98 % | HEIGHT: 63 IN

## 2021-05-15 VITALS — HEART RATE: 87 BPM | HEIGHT: 64 IN | OXYGEN SATURATION: 95 %

## 2021-05-15 VITALS — WEIGHT: 163 LBS | OXYGEN SATURATION: 97 % | BODY MASS INDEX: 28.88 KG/M2 | HEART RATE: 76 BPM | HEIGHT: 63 IN

## 2021-05-15 VITALS — OXYGEN SATURATION: 96 % | HEIGHT: 64 IN | WEIGHT: 160.37 LBS | BODY MASS INDEX: 27.38 KG/M2 | HEART RATE: 68 BPM

## 2021-05-16 VITALS — OXYGEN SATURATION: 97 % | WEIGHT: 154.37 LBS | HEART RATE: 62 BPM | HEIGHT: 63 IN | BODY MASS INDEX: 27.35 KG/M2

## 2021-05-17 ENCOUNTER — OFFICE VISIT CONVERTED (OUTPATIENT)
Dept: ORTHOPEDIC SURGERY | Facility: CLINIC | Age: 81
End: 2021-05-17
Attending: PHYSICIAN ASSISTANT

## 2021-06-05 NOTE — PROGRESS NOTES
Progress Note      Patient Name: Jayne Crenshaw   Patient ID: [id number]   Sex: Female   YOB: 1940    Referring Provider: Willow Mcknight MD    Visit Date: May 17, 2021    Provider: Jaiden Ryder PA-C   Location: [facility]   Location Address: [address] [place] [address]  [id number]   Location Phone: [phone]          Chief Complaint  · Follow up left total hip arthroplasty      History Of Present Illness  Jayne Crenshaw is a 81 year old /White female who presents today to [facility].      Patient follows up today for left DELVIS performed 3/30/2021 by Dr. Tabares.  Patient reports pain has improved since the time of surgery.  Patient has been adherent to interventions and instructions.       Past Medical History  Anemia; Anemia, Unspecified; Arthritis; Bladder disorder; Depression; High cholesterol; Hyperlipemia; Hypertension; Limb Swelling; Seasonal allergies         Past Surgical History  Appendectomy; Artificial Joints/Limbs; Colonoscopy; Hysterectomy; Joint Surgery; Oral Surgery; Tonsillectomy; Tubal ligation         Medication List  Calcium 600 600 mg (1,500 mg) oral tablet; fluconazole 200 mg oral tablet; Fosamax 70 mg oral tablet; Lopressor 50 mg oral tablet; Norvasc 10 mg oral tablet; pravastatin 20 mg oral tablet         Allergy List  Biaxin; propofol         Family Medical History  Heart Disease; Cancer, Unspecified; Diabetes, unspecified type; - No Family History of Colorectal Cancer; Family history of certain chronic disabling diseases; arthritis; Family history of Arthritis         Social History  Alcohol (Never); Alcohol Use (Never); Claustophobic (Unknown); lives alone; Recreational Drug Use (Never); Retired.; Tobacco (Never);          Review of Systems  · Constitutional  o Denies  o : fever, chills, weight loss  · Cardiovascular  o Denies  o : chest pain, shortness of breath  · Gastrointestinal  o Denies  o : liver  "disease, heartburn, nausea, blood in stools  · Genitourinary  o Denies  o : painful urination, blood in urine  · Integument  o Denies  o : rash, itching  · Neurologic  o Denies  o : headache, weakness, loss of consciousness  · Musculoskeletal  o Denies  o : painful, swollen joints  · Psychiatric  o Denies  o : drug/alcohol addiction, anxiety, depression      Vitals  Date Time BP Position Site L\R Cuff Size HR RR TEMP (F) WT  HT  BMI kg/m2 BSA m2 O2 Sat FR L/min FiO2        05/17/2021 02:18 PM      69 - R   168lbs 0oz 5'  2\" 30.73 1.83 93 %            Physical Examination  · Constitutional  o Appearance  o : well developed, well-nourished, no obvious deformities present  · Head and Face  o Head  o :   § Inspection  § : normocephalic  o Face  o :   § Inspection  § : no facial lesions  · Eyes  o Conjunctivae  o : conjunctivae normal  o Sclerae  o : sclerae white  · Ears, Nose, Mouth and Throat  o Ears  o :   § External Ears  § : appearance within normal limits  § Hearing  § : intact  o Nose  o :   § External Nose  § : appearance normal  · Neck  o Inspection/Palpation  o : normal appearance  o Range of Motion  o : full range of motion  · Respiratory  o Respiratory Effort  o : breathing unlabored  o Inspection of Chest  o : normal appearance  o Auscultation of Lungs  o : no audible wheezing or rales  · Cardiovascular  o Heart  o : regular rate  · Gastrointestinal  o Abdominal Examination  o : soft and non-tender  · Skin and Subcutaneous Tissue  o General Inspection  o : intact, no rashes  · Psychiatric  o General  o : Alert and oriented x3  o Judgement and Insight  o : judgment and insight intact  o Mood and Affect  o : mood normal, affect appropriate  · Left Hip  o Inspection  o : Surgical site is appreciated to be healing appropriately with good scar formation that is supple. Full functional range of motion compared to the contralateral side. Able to transition from sit to  smooth single stage without " assistance. Nonantalgic and stable ambulation with use of cane. Neurovascularly intact distally.          Assessment  · Aftercare following left hip joint replacement surgery       Aftercare following joint replacement surgery     V54.81/Z47.1  Presence of left artificial hip joint     V54.81/Z96.642      Plan  · Instructions  o Reviewed the patient's Past Medical, Social, and Family history as well as the ROS at today's visit, no changes.  o Call or return if worsening symptoms.  o Patient may continue to progressively return to all of her regular activities as discussed in clinic today. Patient is to continue with physical therapy and follow-up in 6 weeks with x-rays performed at that time 2 views left hip.  o Portions of this note were generated with voice recognition software. While efforts have been made to proofread the text, some sound alike errors may still persist.             Electronically Signed by: Jaiden Ryder PA-C -Author on May 17, 2021 05:14:46 PM  Electronically Co-signed by: Marco Antonio Tabares MD -Reviewer on May 18, 2021 04:15:29 PM

## 2021-06-28 ENCOUNTER — OFFICE VISIT (OUTPATIENT)
Dept: ORTHOPEDIC SURGERY | Facility: CLINIC | Age: 81
End: 2021-06-28

## 2021-06-28 VITALS — HEART RATE: 62 BPM | OXYGEN SATURATION: 93 % | BODY MASS INDEX: 30.36 KG/M2 | WEIGHT: 165 LBS | HEIGHT: 62 IN

## 2021-06-28 DIAGNOSIS — M25.552 LEFT HIP PAIN: Primary | ICD-10-CM

## 2021-06-28 DIAGNOSIS — Z47.89 AFTERCARE FOLLOWING SURGERY OF THE MUSCULOSKELETAL SYSTEM: ICD-10-CM

## 2021-06-28 PROCEDURE — 99024 POSTOP FOLLOW-UP VISIT: CPT | Performed by: PHYSICIAN ASSISTANT

## 2021-06-28 RX ORDER — ERGOCALCIFEROL 1.25 MG/1
CAPSULE ORAL
COMMUNITY
Start: 2021-04-10

## 2021-06-28 RX ORDER — AMLODIPINE BESYLATE 10 MG/1
TABLET ORAL
COMMUNITY
Start: 2021-05-29 | End: 2022-12-07

## 2021-06-28 RX ORDER — AMLODIPINE BESYLATE 10 MG/1
TABLET ORAL
COMMUNITY
Start: 2021-03-01

## 2021-06-28 RX ORDER — PRAVASTATIN SODIUM 20 MG
TABLET ORAL
COMMUNITY
Start: 2021-03-01

## 2021-06-28 RX ORDER — METOPROLOL TARTRATE 50 MG/1
TABLET, FILM COATED ORAL
COMMUNITY

## 2021-06-28 RX ORDER — ALENDRONATE SODIUM 70 MG/1
TABLET ORAL
COMMUNITY

## 2021-06-28 NOTE — PROGRESS NOTES
"Chief Complaint  Pain of the Left Hip    Subjective          Jayne Crenshaw presents to [facility] for 3-month postop follow-up for left DELVIS performed by Dr. Tabares 3/30/2021.  Patient states that she has very minimal pain.  She is doing physical therapy and has her last appointment scheduled Wednesday.  She plans to discontinue PT at that point and work on home exercises only.  She is not using a cane or walker today but states she occasionally uses a cane on rough terrain or if she is going to be doing a lot of activity.    Objective   Vital Signs:   Pulse 62   Ht 157.5 cm (62\")   Wt 74.8 kg (165 lb)   SpO2 93%   BMI 30.18 kg/m²       Physical Exam  Constitutional:       Appearance: Normal appearance. He is well-developed and normal weight.   HENT:      Head: Normocephalic.      Right Ear: Hearing and external ear normal.      Left Ear: Hearing and external ear normal.      Nose: Nose normal.   Eyes:      Conjunctiva/sclera: Conjunctivae normal.   Cardiovascular:      Rate and Rhythm: Normal rate.   Pulmonary:      Effort: Pulmonary effort is normal.      Breath sounds: No wheezing or rales.   Abdominal:      Palpations: Abdomen is soft.      Tenderness: There is no abdominal tenderness.   Musculoskeletal:      Cervical back: Normal range of motion.   Skin:     Findings: No rash.   Neurological:      Mental Status: He is alert and oriented to person, place, and time.   Psychiatric:         Mood and Affect: Mood and affect normal.         Judgment: Judgment normal.     Ortho Exam  Left hip: Skin intact with very well-healed surgical incision.  She has full flexion and extension of the left hip.  No pain with internal or external rotation.  No swelling appreciated.  Dorsalis pedis pulses 2+ bilaterally, neurovascularly intact.  Full range of motion left knee ankle and digits on left lower extremity.  Gait is within normal limits, nonantalgic.  Result Review :            Imaging " Results (Most Recent)     Procedure Component Value Units Date/Time    XR Hip With or Without Pelvis 2 - 3 View Left [[id number]] Resulted: 06/28/21 1207     Updated: 06/28/21 1207    Narrative:      X-Ray Report:  Study: X-rays ordered, taken in the office, and reviewed today  Site: Left hip xray  Indication: Pain, postop checkup  View: AP and Lateral view(s)  Findings: Hardware intact, no soft tissue swelling, osseous abnormalities   or malalignment appreciated on this exam.  Prior studies available for comparison: yes                   Assessment and Plan    Problem List Items Addressed This Visit        Musculoskeletal and Injuries    Left hip pain - Primary    Relevant Orders    XR Hip With or Without Pelvis 2 - 3 View Left (Completed)    Aftercare following surgery of the musculoskeletal system    Current Assessment & Plan     Follow-up in 9 months for recheck on left hip replacement at annual checkup.               Follow Up   Return in about 3 months (around 9/28/2021) for Recheck.  Patient Instructions   Follow-up for your annual left hip checkup in 9 months time    Patient was given instructions and counseling regarding her condition or for health maintenance advice. Please see specific information pulled into the AVS if appropriate.

## 2021-07-15 VITALS — HEART RATE: 69 BPM | BODY MASS INDEX: 30.91 KG/M2 | WEIGHT: 168 LBS | HEIGHT: 62 IN | OXYGEN SATURATION: 93 %

## 2021-10-06 ENCOUNTER — TRANSCRIBE ORDERS (OUTPATIENT)
Dept: ADMINISTRATIVE | Facility: HOSPITAL | Age: 81
End: 2021-10-06

## 2021-10-06 DIAGNOSIS — Z78.0 POST-MENOPAUSAL: Primary | ICD-10-CM

## 2022-01-04 ENCOUNTER — APPOINTMENT (OUTPATIENT)
Dept: BONE DENSITY | Facility: HOSPITAL | Age: 82
End: 2022-01-04

## 2022-01-04 ENCOUNTER — HOSPITAL ENCOUNTER (OUTPATIENT)
Dept: BONE DENSITY | Facility: HOSPITAL | Age: 82
Discharge: HOME | End: 2022-01-04
Admitting: FAMILY MEDICINE

## 2022-01-04 DIAGNOSIS — Z78.0 POST-MENOPAUSAL: ICD-10-CM

## 2022-01-04 PROCEDURE — 77080 DXA BONE DENSITY AXIAL: CPT

## 2022-03-28 ENCOUNTER — OFFICE VISIT (OUTPATIENT)
Dept: ORTHOPEDIC SURGERY | Facility: CLINIC | Age: 82
End: 2022-03-28

## 2022-03-28 VITALS — WEIGHT: 166.5 LBS | HEIGHT: 64 IN | BODY MASS INDEX: 28.42 KG/M2

## 2022-03-28 DIAGNOSIS — Z47.89 AFTERCARE FOLLOWING SURGERY OF THE MUSCULOSKELETAL SYSTEM: ICD-10-CM

## 2022-03-28 DIAGNOSIS — M25.552 LEFT HIP PAIN: Primary | ICD-10-CM

## 2022-03-28 PROCEDURE — 99212 OFFICE O/P EST SF 10 MIN: CPT | Performed by: PHYSICIAN ASSISTANT

## 2022-03-28 RX ORDER — AMLODIPINE BESYLATE 10 MG/1
1 TABLET ORAL DAILY
COMMUNITY
Start: 2021-12-07 | End: 2022-12-07

## 2022-03-28 NOTE — PATIENT INSTRUCTIONS
Left hip annual total joint x-rays taken and reviewed today.  Follow-up in 1 year for left hip annual total joint recheck with x-rays at that time.    Right DELVIS follow-up September 2022.

## 2022-03-28 NOTE — PROGRESS NOTES
"Chief Complaint  Follow-up of the Left Hip    Subjective          Jayne Crenshaw presents to [facility] for follow-up on left hip status post left DELVIS by Dr. Tabares 3/30/2021.  Patient states she is doing well and is very happy with her results.  Denies any new injuries.  Still gets swelling of the left lower extremity, denies calf pain.  She states she wears compression socks to help.  She also has a history of right DELVIS in 2019 by Dr. Tabares that is doing well.    Objective   Allergies   Allergen Reactions   • Propofol Swelling   • Clarithromycin Unknown - Low Severity   • Doxylamine Swelling       Vital Signs:   Ht 162.6 cm (64\")   Wt 75.5 kg (166 lb 8 oz)   BMI 28.58 kg/m²       Physical Exam  Constitutional:       Appearance: Normal appearance. Patient is well-developed and normal weight.   HENT:      Head: Normocephalic.      Right Ear: Hearing and external ear normal.      Left Ear: Hearing and external ear normal.      Nose: Nose normal.   Eyes:      Conjunctiva/sclera: Conjunctivae normal.   Cardiovascular:      Rate and Rhythm: Normal rate.   Pulmonary:      Effort: Pulmonary effort is normal.      Breath sounds: No wheezing or rales.   Abdominal:      Palpations: Abdomen is soft.      Tenderness: There is no abdominal tenderness.   Musculoskeletal:      Cervical back: Normal range of motion.   Skin:     Findings: No rash.   Neurological:      Mental Status: Patient is alert and oriented to person, place, and time.   Psychiatric:         Mood and Affect: Mood and affect normal.         Judgment: Judgment normal.     Ortho Exam  Left hip: Skin intact with well-healed surgical incision, no tenderness or swelling, good flexion and abduction, gait nonantalgic, no pain in the groin with internal or external rotation, good range of motion left knee ankle digits, sensation intact in posterior tib pulses 2+  Result Review :            Imaging Results (Most Recent)     Procedure " Component Value Units Date/Time    XR Hip With or Without Pelvis 2 - 3 View Left [[id number]] Resulted: 03/28/22 1020     Updated: 03/28/22 1021    Narrative:      X-Ray Report:  Study: X-rays ordered, taken in the office, and reviewed today  Site: Left hip xray  Indication: Pain  View: AP and Lateral view(s)  Findings: Hardware intact from left DELVIS without failure or loosening, no   malalignment  Prior studies available for comparison: yes                   Assessment and Plan    Problem List Items Addressed This Visit        Musculoskeletal and Injuries    Left hip pain - Primary    Relevant Orders    XR Hip With or Without Pelvis 2 - 3 View Left (Completed)    Aftercare following surgery of the musculoskeletal system    Current Assessment & Plan     Left hip annual total joint x-rays taken and reviewed today.  Follow-up in 1 year for left hip annual total joint recheck with x-rays at that time.    Right DELVIS follow-up September 2022.                 Follow Up   Return in about 1 year (around 3/28/2023) for For annual total joint recheck.  Patient Instructions   Left hip annual total joint x-rays taken and reviewed today.  Follow-up in 1 year for left hip annual total joint recheck with x-rays at that time.    Right DELVIS follow-up September 2022.    Patient was given instructions and counseling regarding her condition or for health maintenance advice. Please see specific information pulled into the AVS if appropriate.

## 2022-09-28 ENCOUNTER — OFFICE VISIT (OUTPATIENT)
Dept: ORTHOPEDIC SURGERY | Facility: CLINIC | Age: 82
End: 2022-09-28

## 2022-09-28 VITALS — WEIGHT: 176 LBS | OXYGEN SATURATION: 98 % | HEIGHT: 64 IN | BODY MASS INDEX: 30.05 KG/M2 | HEART RATE: 76 BPM

## 2022-09-28 DIAGNOSIS — Z96.643 AFTERCARE FOLLOWING BILATERAL HIP JOINT REPLACEMENT SURGERY: ICD-10-CM

## 2022-09-28 DIAGNOSIS — Z47.1 AFTERCARE FOLLOWING BILATERAL HIP JOINT REPLACEMENT SURGERY: ICD-10-CM

## 2022-09-28 DIAGNOSIS — Z47.89 AFTERCARE FOLLOWING SURGERY OF THE MUSCULOSKELETAL SYSTEM: Primary | ICD-10-CM

## 2022-09-28 PROCEDURE — 99213 OFFICE O/P EST LOW 20 MIN: CPT | Performed by: PHYSICIAN ASSISTANT

## 2022-09-28 NOTE — PATIENT INSTRUCTIONS
X-rays reviewed, showing hardware intact. Continue home exercises.     Continue with lifelong antibiotic prophylaxis with dental procedures following total joint replacement.    Follow-up in 1 year. Call sooner, if needed with any changes or concerns. Repeat x-rays.

## 2022-09-28 NOTE — PROGRESS NOTES
"Chief Complaint  Follow-up of bilateral hips    Subjective      Jayne Crenshaw presents to [facility] for follow-up of bilateral total hip arthroplasties.  She underwent right total hip arthroplasty on 3/26/2019 and left total hip arthroplasty on 3/30/2021 by Dr. Tabares.  She presents today for annual follow-up.  She presents independently ambulatory without use of assistive device.  Reports she is doing very well and has returned to usual activities without any complaints of pain.  She does report some residual swelling to the left ankle, for which she wears compression stockings as needed.  She is no longer in physical therapy.    Objective   Allergies   Allergen Reactions   • Propofol Swelling   • Clarithromycin Unknown - Low Severity   • Doxylamine Swelling       Vital Signs:   Pulse 76   Ht 162.6 cm (64\")   Wt 79.8 kg (176 lb)   SpO2 98%   BMI 30.21 kg/m²       Physical Exam    Constitutional: Awake, alert. Well nourished appearance.    Integumentary: Warm, dry, intact. No obvious rashes.    HENT: Atraumatic, normocephalic.   Respiratory: Non labored respirations .   Cardiovascular: Intact peripheral pulses.    Psychiatric: Normal mood and affect. A&O X3    Ortho Exam  Left hip: Well-healed surgical scar noted.  Good strength to hip flexors, extensors, abductors, and adductor's.  No pain with passive internal or external rotation of the hip.  Full knee extension and flexion.  Full plantarflexion and dorsiflexion of the ankle.  Sensation is intact to light touch.  Distal neurovascular intact.    Right hip: Well-healed surgical scar noted.  Good strength to hip flexors, extensors, abductors, and adductor's.  No pain with passive internal or external rotation of the hip.  Full knee flexion and extension.  Full plantarflexion and dorsiflexion of the ankle.  Sensation intact to light touch.  Distal neurovascular intact.    Fully weightbearing with nonantalgic gait " noted.    Imaging Results (Most Recent)     Procedure Component Value Units Date/Time    XR Hips Bilateral With or Without Pelvis 3-4 View [[id number]] Resulted: 09/28/22 1156     Updated: 09/28/22 1159    Narrative:      X-Ray Report:  Study: X-rays ordered, taken in the office, and reviewed today.   Site: Bilateral hip/pelvis Xray  Indication: Bilateral DELVIS  View: AP and Lateral view(s)  Findings: Intact bilateral total hip arthroplasties without evidence of   hardware malfunction or loosening.  Prior studies available for comparison: yes               Assessment and Plan   Problem List Items Addressed This Visit        Musculoskeletal and Injuries    Aftercare following surgery of the musculoskeletal system - Primary    Relevant Orders    XR Hips Bilateral With or Without Pelvis 3-4 View (Completed)      Other Visit Diagnoses     Aftercare following bilateral hip joint replacement surgery            Follow Up   Return in about 1 year (around 9/28/2023).    Patient Instructions   X-rays reviewed, showing hardware intact. Continue home exercises.     Continue with lifelong antibiotic prophylaxis with dental procedures following total joint replacement.    Follow-up in 1 year. Call sooner, if needed with any changes or concerns. Repeat x-rays.       Patient was given instructions and counseling regarding her condition or for health maintenance advice. Please see specific information pulled into the AVS if appropriate.

## 2022-10-19 ENCOUNTER — HOSPITAL ENCOUNTER (EMERGENCY)
Facility: HOSPITAL | Age: 82
Discharge: HOME | End: 2022-10-19
Attending: EMERGENCY MEDICINE | Admitting: EMERGENCY MEDICINE

## 2022-10-19 VITALS
DIASTOLIC BLOOD PRESSURE: 88 MMHG | WEIGHT: 166.23 LBS | TEMPERATURE: 98.8 F | BODY MASS INDEX: 29.45 KG/M2 | OXYGEN SATURATION: 96 % | SYSTOLIC BLOOD PRESSURE: 159 MMHG | HEIGHT: 63 IN | RESPIRATION RATE: 20 BRPM | HEART RATE: 82 BPM

## 2022-10-19 DIAGNOSIS — S80.12XA CONTUSION OF LEFT LOWER LEG, INITIAL ENCOUNTER: Primary | ICD-10-CM

## 2022-10-19 LAB
ALBUMIN SERPL-MCNC: 4.5 G/DL (ref 3.5–5.2)
ALBUMIN/GLOB SERPL: 1.7 G/DL
ALP SERPL-CCNC: 76 U/L (ref 39–117)
ALT SERPL W P-5'-P-CCNC: 13 U/L (ref 1–33)
ANION GAP SERPL CALCULATED.3IONS-SCNC: 9.5 MMOL/L (ref 5–15)
AST SERPL-CCNC: 17 U/L (ref 1–32)
BASOPHILS # BLD AUTO: 0.1 10*3/MM3 (ref 0–0.2)
BASOPHILS NFR BLD AUTO: 1 % (ref 0–1.5)
BILIRUB SERPL-MCNC: 0.3 MG/DL (ref 0–1.2)
BUN SERPL-MCNC: 16 MG/DL (ref 8–23)
BUN/CREAT SERPL: 15.5 (ref 7–25)
CALCIUM SPEC-SCNC: 9.2 MG/DL (ref 8.6–10.5)
CHLORIDE SERPL-SCNC: 102 MMOL/L (ref 98–107)
CO2 SERPL-SCNC: 27.5 MMOL/L (ref 22–29)
CREAT SERPL-MCNC: 1.03 MG/DL (ref 0.57–1)
DEPRECATED RDW RBC AUTO: 48.1 FL (ref 37–54)
EGFRCR SERPLBLD CKD-EPI 2021: 54.1 ML/MIN/1.73
EOSINOPHIL # BLD AUTO: 0.15 10*3/MM3 (ref 0–0.4)
EOSINOPHIL NFR BLD AUTO: 1.5 % (ref 0.3–6.2)
ERYTHROCYTE [DISTWIDTH] IN BLOOD BY AUTOMATED COUNT: 14.3 % (ref 12.3–15.4)
GLOBULIN UR ELPH-MCNC: 2.6 GM/DL
GLUCOSE SERPL-MCNC: 123 MG/DL (ref 65–99)
HCT VFR BLD AUTO: 41.8 % (ref 34–46.6)
HGB BLD-MCNC: 13.3 G/DL (ref 12–15.9)
HOLD SPECIMEN: NORMAL
HOLD SPECIMEN: NORMAL
IMM GRANULOCYTES # BLD AUTO: 0.03 10*3/MM3 (ref 0–0.05)
IMM GRANULOCYTES NFR BLD AUTO: 0.3 % (ref 0–0.5)
LYMPHOCYTES # BLD AUTO: 3.4 10*3/MM3 (ref 0.7–3.1)
LYMPHOCYTES NFR BLD AUTO: 33.4 % (ref 19.6–45.3)
MCH RBC QN AUTO: 29.3 PG (ref 26.6–33)
MCHC RBC AUTO-ENTMCNC: 31.8 G/DL (ref 31.5–35.7)
MCV RBC AUTO: 92.1 FL (ref 79–97)
MONOCYTES # BLD AUTO: 0.5 10*3/MM3 (ref 0.1–0.9)
MONOCYTES NFR BLD AUTO: 4.9 % (ref 5–12)
NEUTROPHILS NFR BLD AUTO: 58.9 % (ref 42.7–76)
NEUTROPHILS NFR BLD AUTO: 6 10*3/MM3 (ref 1.7–7)
NRBC BLD AUTO-RTO: 0 /100 WBC (ref 0–0.2)
PLATELET # BLD AUTO: 307 10*3/MM3 (ref 140–450)
PMV BLD AUTO: 9.7 FL (ref 6–12)
POTASSIUM SERPL-SCNC: 4.1 MMOL/L (ref 3.5–5.2)
PROT SERPL-MCNC: 7.1 G/DL (ref 6–8.5)
RBC # BLD AUTO: 4.54 10*6/MM3 (ref 3.77–5.28)
SODIUM SERPL-SCNC: 139 MMOL/L (ref 136–145)
WBC NRBC COR # BLD: 10.18 10*3/MM3 (ref 3.4–10.8)
WHOLE BLOOD HOLD COAG: NORMAL
WHOLE BLOOD HOLD SPECIMEN: NORMAL

## 2022-10-19 PROCEDURE — 36415 COLL VENOUS BLD VENIPUNCTURE: CPT

## 2022-10-19 PROCEDURE — 80053 COMPREHEN METABOLIC PANEL: CPT | Performed by: EMERGENCY MEDICINE

## 2022-10-19 PROCEDURE — 99282 EMERGENCY DEPT VISIT SF MDM: CPT

## 2022-10-19 PROCEDURE — 85025 COMPLETE CBC W/AUTO DIFF WBC: CPT

## 2022-10-19 NOTE — ED TRIAGE NOTES
Pt to ED from home with reports of red knot to left lower leg since waking up this morning.  Pt states she went to bed at 10pm and leg was normal.      Pt states she has chronic swelling to LLE s/p his surgery two years ago.  Pt denies history of a fib and doesn't take blood thinners.

## 2022-10-19 NOTE — ED PROVIDER NOTES
Time: 1:17 AM EDT  Arrived by: private car  Chief Complaint:   Chief Complaint   Patient presents with   • Leg Swelling     History provided by: patient  History is limited by: N/A     History of Present Illness:      Patient is a 82 y.o. year old female that presents to the emergency department with L leg swelling/pain. Pt states she went to bed at 10 PM and her leg was normal, but woke up during the night with leg tenderness. Pt reports chronic, intermittent swelling in L leg since L hip surgery two years ago. Pt denies nausea, vomiting, abdominal pain, fever, chills, breathing issues, chest pain, cough, and congestion.       History provided by:  Patient   used: No        Similar Symptoms Previously: N/A  Recently seen: N/A      Patient Care Team  Primary Care Provider: Willow Mcknight MD    Past Medical History:     Allergies   Allergen Reactions   • Propofol Swelling   • Clarithromycin Unknown - Low Severity   • Doxylamine Swelling     Past Medical History:   Diagnosis Date   • Anemia    • Arthritis    • Bladder disorder    • Depression    • High cholesterol    • Hyperlipemia    • Hypertension    • Limb swelling    • Seasonal allergies      Past Surgical History:   Procedure Laterality Date   • APPENDECTOMY     • COLONOSCOPY  1990   • ESSURE TUBAL LIGATION     • HIP SURGERY     • HYSTERECTOMY     • JOINT REPLACEMENT     • MOUTH SURGERY     • OTHER SURGICAL HISTORY      ARTIFICAL JOINTS/ LIMBS    • OTHER SURGICAL HISTORY      JOINT SURGERY   • TONSILLECTOMY       Family History   Problem Relation Age of Onset   • Heart disease Mother    • Diabetes Mother    • Arthritis Mother    • Heart disease Father    • Cancer Daughter        Home Medications:  Prior to Admission medications    Medication Sig Start Date End Date Taking? Authorizing Provider   alendronate (FOSAMAX) 70 MG tablet Fosamax 70 mg oral tablet take 1 tablet (70 mg) by oral route once weekly in the morning, at least 30  min before first food, beverage, or medication of day   Active    Ana Maria Perry MD   amLODIPine (NORVASC) 10 MG tablet Norvasc 10 mg oral tablet take 1 tablet (10 mg) by oral route once daily   Active 3/1/21   Ana Maria Perry MD   amLODIPine (NORVASC) 10 MG tablet  5/29/21   Ana Maria Perry MD   amLODIPine (NORVASC) 10 MG tablet Take 1 tablet by mouth Daily. 12/7/21   Ana Maria Perry MD   calcium carbonate (MAALOX) 600 MG chewable tablet Chew 600 mg.    Ana Maria Perry MD   Calcium Carbonate 1500 (600 Ca) MG tablet Calcium 600 600 mg (1,500 mg) oral tablet take 1 tablet by oral route 2 times a day   Active    Ana Maria Perry MD   metoprolol tartrate (LOPRESSOR) 50 MG tablet Lopressor 50 mg oral tablet take 1 tablet (50 mg) by oral route 2 times per day with meals   Active    Ana Maria Perry MD   pravastatin (PRAVACHOL) 20 MG tablet pravastatin 20 mg oral tablet take 1 tablet (20 mg) by oral route once daily   Active 3/1/21   Ana Maria Perry MD   vitamin D (ERGOCALCIFEROL) 1.25 MG (70374 UT) capsule capsule TAKE 1 CAPSULE BY MOUTH EVERY MONDAY 4/10/21   Ana Maria Perry MD        Social History:   Social History     Tobacco Use   • Smoking status: Never   • Smokeless tobacco: Never   Substance Use Topics   • Alcohol use: Never   • Drug use: Never     Recent travel: not applicable     Review of Systems:  Review of Systems   Constitutional: Negative for chills and fever.   HENT: Negative for congestion, ear pain and sore throat.    Eyes: Negative for pain.   Respiratory: Negative for cough, chest tightness and shortness of breath.    Cardiovascular: Positive for leg swelling (with tenderness). Negative for chest pain.   Gastrointestinal: Negative for abdominal pain, diarrhea, nausea and vomiting.   Genitourinary: Negative for flank pain and hematuria.   Musculoskeletal: Negative for joint swelling.   Skin: Negative for pallor.   Neurological: Negative for seizures  "and headaches.        Physical Exam:  /88 (BP Location: Left arm, Patient Position: Sitting)   Pulse 82   Temp 98.8 °F (37.1 °C) (Oral)   Resp 20   Ht 160 cm (63\")   Wt 75.4 kg (166 lb 3.6 oz)   SpO2 96%   BMI 29.45 kg/m²     Physical Exam  Vitals and nursing note reviewed.   Constitutional:       General: She is not in acute distress.     Appearance: Normal appearance. She is not toxic-appearing.   HENT:      Head: Normocephalic and atraumatic.      Nose: Nose normal.      Mouth/Throat:      Mouth: Mucous membranes are moist.   Eyes:      General: No scleral icterus.     Extraocular Movements: Extraocular movements intact.      Conjunctiva/sclera: Conjunctivae normal.      Pupils: Pupils are equal, round, and reactive to light.   Cardiovascular:      Rate and Rhythm: Normal rate and regular rhythm.      Pulses: Normal pulses.      Heart sounds: Normal heart sounds.   Pulmonary:      Effort: Pulmonary effort is normal. No respiratory distress.      Breath sounds: Normal breath sounds.   Abdominal:      General: Abdomen is flat. There is no distension.      Palpations: Abdomen is soft.      Tenderness: There is no abdominal tenderness.   Musculoskeletal:         General: Normal range of motion.      Cervical back: Normal range of motion and neck supple.      Left lower leg: Tenderness present.      Comments: Small area of tenderness with mild erythema to lower shin on left. No lower extremity edema. Leg is neurovascularly intact. No tenderness over calf   Skin:     General: Skin is warm and dry.      Capillary Refill: Capillary refill takes less than 2 seconds.   Neurological:      General: No focal deficit present.      Mental Status: She is alert and oriented to person, place, and time. Mental status is at baseline.   Psychiatric:         Mood and Affect: Mood normal.         Behavior: Behavior normal.                Medications in the Emergency Department:  Medications - No data to display "     Labs  Lab Results (last 24 hours)     Procedure Component Value Units Date/Time    CBC & Differential [[id number]]  (Abnormal) Collected: 10/19/22 0055    Specimen: Blood Updated: 10/19/22 0106    Narrative:      The following orders were created for panel order CBC & Differential.  Procedure                               Abnormality         Status                     ---------                               -----------         ------                     CBC Auto Differential[[id number]]        Abnormal            Final result                 Please view results for these tests on the individual orders.    Comprehensive Metabolic Panel [[id number]]  (Abnormal) Collected: 10/19/22 0055    Specimen: Blood Updated: 10/19/22 0121     Glucose 123 mg/dL      BUN 16 mg/dL      Creatinine 1.03 mg/dL      Sodium 139 mmol/L      Potassium 4.1 mmol/L      Chloride 102 mmol/L      CO2 27.5 mmol/L      Calcium 9.2 mg/dL      Total Protein 7.1 g/dL      Albumin 4.50 g/dL      ALT (SGPT) 13 U/L      AST (SGOT) 17 U/L      Alkaline Phosphatase 76 U/L      Total Bilirubin 0.3 mg/dL      Globulin 2.6 gm/dL      A/G Ratio 1.7 g/dL      BUN/Creatinine Ratio 15.5     Anion Gap 9.5 mmol/L      eGFR 54.1 mL/min/1.73      Comment: National Kidney Foundation and American Society of Nephrology (ASN) Task Force recommended calculation based on the Chronic Kidney Disease Epidemiology Collaboration (CKD-EPI) equation refit without adjustment for race.       Narrative:      GFR Normal >60  Chronic Kidney Disease <60  Kidney Failure <15      CBC Auto Differential [[id number]]  (Abnormal) Collected: 10/19/22 0055    Specimen: Blood Updated: 10/19/22 0106     WBC 10.18 10*3/mm3      RBC 4.54 10*6/mm3      Hemoglobin 13.3 g/dL      Hematocrit 41.8 %      MCV 92.1 fL      MCH 29.3 pg      MCHC 31.8 g/dL      RDW 14.3 %      RDW-SD 48.1 fl      MPV 9.7 fL      Platelets 307 10*3/mm3      Neutrophil % 58.9 %      Lymphocyte % 33.4 %      Monocyte % 4.9  %      Eosinophil % 1.5 %      Basophil % 1.0 %      Immature Grans % 0.3 %      Neutrophils, Absolute 6.00 10*3/mm3      Lymphocytes, Absolute 3.40 10*3/mm3      Monocytes, Absolute 0.50 10*3/mm3      Eosinophils, Absolute 0.15 10*3/mm3      Basophils, Absolute 0.10 10*3/mm3      Immature Grans, Absolute 0.03 10*3/mm3      nRBC 0.0 /100 WBC            Imaging:  No Radiology Exams Resulted Within Past 24 Hours    Procedures:  Procedures    Progress                            Medical Decision Making:  MDM  Number of Diagnoses or Management Options  Contusion of left lower leg, initial encounter  Diagnosis management comments: Patient is afebrile nontoxic-appearing.  Vital signs are stable.  At time of evaluation she is lying in bed in no acute distress.  Patient presented to the emergency department for small tender area over her left anterior shin.  Patient was concerned for possible DVT.  She has no swelling of her lower extremity.  No tenderness over her calf or thigh.  Presentation more consistent with a small contusion.  Initial blood pressure was elevated.  Repeat improved without intervention.  I recommend she follows up with her primary physician.  Discussed return precautions, discharge instructions and answered all her questions.         Amount and/or Complexity of Data Reviewed  Clinical lab tests: ordered and reviewed    Risk of Complications, Morbidity, and/or Mortality  Presenting problems: low  Management options: low    Patient Progress  Patient progress: stable       Final diagnoses:   Contusion of left lower leg, initial encounter        Disposition:  ED Disposition     ED Disposition   Discharge    Condition   Stable    Comment   --             This medical record created using voice recognition software and may contain unintended errors.    Documentation assistance provided by Kandy Acevedo acting as scribe for Karina Zimmerman MD. Information recorded by the scribe was done at my direction  and has been verified and validated by me.          Kandy Acevedo  10/19/22 0128       Karina Zimmerman MD  10/19/22 4108

## 2023-03-27 ENCOUNTER — OFFICE VISIT (OUTPATIENT)
Dept: ORTHOPEDIC SURGERY | Facility: CLINIC | Age: 83
End: 2023-03-27
Payer: MEDICARE

## 2023-03-27 VITALS — BODY MASS INDEX: 29.41 KG/M2 | HEIGHT: 63 IN | WEIGHT: 166 LBS

## 2023-03-27 DIAGNOSIS — Z96.643 AFTERCARE FOLLOWING BILATERAL HIP JOINT REPLACEMENT SURGERY: Primary | ICD-10-CM

## 2023-03-27 DIAGNOSIS — Z96.643 HISTORY OF BILATERAL TOTAL HIP ARTHROPLASTY: ICD-10-CM

## 2023-03-27 DIAGNOSIS — Z47.1 AFTERCARE FOLLOWING BILATERAL HIP JOINT REPLACEMENT SURGERY: Primary | ICD-10-CM

## 2023-03-27 PROCEDURE — 1160F RVW MEDS BY RX/DR IN RCRD: CPT | Performed by: PHYSICIAN ASSISTANT

## 2023-03-27 PROCEDURE — 99213 OFFICE O/P EST LOW 20 MIN: CPT | Performed by: PHYSICIAN ASSISTANT

## 2023-03-27 PROCEDURE — 1159F MED LIST DOCD IN RCRD: CPT | Performed by: PHYSICIAN ASSISTANT

## 2023-03-27 NOTE — PATIENT INSTRUCTIONS
X-rays reviewed, showing hardware intact. Continue home exercise program to progress strength and ROM.     Continue with lifelong antibiotic prophylaxis with dental procedures following total joint replacement.    Follow-up in 12 months. Call sooner, if needed with any changes or concerns. Repeat x-rays.

## 2023-03-27 NOTE — PROGRESS NOTES
"Chief Complaint  Follow-up and Pain of the Left Hip and Pain and Follow-up of the Right Hip    Subjective      Jayne Crenshaw presents to [facility] for a follow up for bilateral hip pain. She underwent right hip arthroplasty done on 03/26/2019 and her left hip arthroplasty done on 03/30/2021 performed by Dr. Tabares. She reports more swelling to her left leg but reports her compression socks have helped. She is ambulating today without any assistive devices today. She is overall happy with how her hips are doing. She reports she has been able to return to all her normal activities without having any pain. She reports no groin pain. She reports no falls or injury since her last visit.     Objective   Allergies   Allergen Reactions   • Propofol Swelling   • Clarithromycin Unknown - Low Severity   • Doxylamine Swelling       Vital Signs:   Ht 160 cm (63\")   Wt 75.3 kg (166 lb)   BMI 29.41 kg/m²       Physical Exam    Constitutional: Awake, alert. Well nourished appearance.    Integumentary: Warm, dry, intact. No obvious rashes.    HENT: Atraumatic, normocephalic.   Respiratory: Non labored respirations .   Cardiovascular: Intact peripheral pulses.    Psychiatric: Normal mood and affect. A&O X3    Ortho Exam  Left lower extremity: well healed surgical incision, skin is warm, dry and intact, good strength to the hip flexors, extensors, abductors and adductors, full knee extension and flexion, non tender to palpation to her left greater trochanter, neurovascularly intact, calf soft, positive EHL, FHL, GS, and TA. Sensation intact to all 5 nerves of the foot.      Right lower extremity: well healed surgical incision, skin is warm, dry and intact, good strength to the hip flexors, extensors, abductors and adductors, full knee extension and flexion, non tender to palpation to her right greater trochanter, neurovascularly intact, calf soft, positive EHL, FHL, GS, and TA. Sensation intact to " all 5 nerves of the foot.      Imaging:   X-Ray Report:  Study: X-rays ordered, taken in the office, and reviewed today.   Site: Bilateral hips/pelvis Xray  Indication: THAs  View: AP/Lateral view(s)  Findings: Intact left total hip arthroplasties noted. No evidence of hardware malfunction or loosening, subsidence, or periprosthetic fracture.   Prior studies available for comparison: yes            Assessment and Plan   Problem List Items Addressed This Visit    None  Visit Diagnoses     Aftercare following bilateral hip joint replacement surgery    -  Primary    Relevant Orders    XR Hips Bilateral With or Without Pelvis 3-4 View (Completed)    History of bilateral total hip arthroplasty            Follow Up   Return in about 1 year (around 3/27/2024).  Patient is a non-smoker.  Did not discuss options for smoking cessation.    Patient Instructions   X-rays reviewed, showing hardware intact. Continue home exercise program to progress strength and ROM.     Continue with lifelong antibiotic prophylaxis with dental procedures following total joint replacement.    Follow-up in 12 months. Call sooner, if needed with any changes or concerns. Repeat x-rays.       Patient was given instructions and counseling regarding her condition or for health maintenance advice. Please see specific information pulled into the AVS if appropriate.

## 2023-10-02 ENCOUNTER — OFFICE VISIT (OUTPATIENT)
Dept: ORTHOPEDIC SURGERY | Facility: CLINIC | Age: 83
End: 2023-10-02
Payer: MEDICARE

## 2023-10-02 VITALS — WEIGHT: 162 LBS | HEIGHT: 63 IN | BODY MASS INDEX: 28.7 KG/M2

## 2023-10-02 DIAGNOSIS — Z96.643 HISTORY OF BILATERAL TOTAL HIP ARTHROPLASTY: ICD-10-CM

## 2023-10-02 DIAGNOSIS — Z47.1 AFTERCARE FOLLOWING BILATERAL HIP JOINT REPLACEMENT SURGERY: Primary | ICD-10-CM

## 2023-10-02 DIAGNOSIS — Z96.643 AFTERCARE FOLLOWING BILATERAL HIP JOINT REPLACEMENT SURGERY: Primary | ICD-10-CM

## 2023-10-02 PROCEDURE — 99213 OFFICE O/P EST LOW 20 MIN: CPT | Performed by: PHYSICIAN ASSISTANT

## 2023-10-02 PROCEDURE — 1159F MED LIST DOCD IN RCRD: CPT | Performed by: PHYSICIAN ASSISTANT

## 2023-10-02 PROCEDURE — 1160F RVW MEDS BY RX/DR IN RCRD: CPT | Performed by: PHYSICIAN ASSISTANT

## 2023-10-02 NOTE — PROGRESS NOTES
"Chief Complaint  Pain and Follow-up of the Right Hip and Pain and Follow-up of the Left Hip    Subjective      Jayne Crenshaw presents to [facility] for follow-up of bilateral hips.  She has a history of right total hip arthroplasty performed on 3/26/2019 and left total hip arthroplasty on 3/30/2021, by Dr. Tabares.  She presents to review 3 without use of assistive device.  Reports that her hips are \"awesome\".  Reports that she is now able to walk without pain.  She is pleased with her postoperative outcome.    Objective   Allergies   Allergen Reactions    Propofol Swelling    Clarithromycin Unknown - Low Severity    Doxylamine Swelling       Vital Signs:   Ht 160 cm (63\")   Wt 73.5 kg (162 lb)   BMI 28.70 kg/m²       Physical Exam    Constitutional: Awake, alert. Well nourished appearance.    Integumentary: Warm, dry, intact. No obvious rashes.    HENT: Atraumatic, normocephalic.   Respiratory: Non labored respirations .   Cardiovascular: Intact peripheral pulses.    Psychiatric: Normal mood and affect. A&O X3    Ortho Exam  Bilateral hips: Skin is warm, dry, and intact.  Well-healed surgical scars noted.  No pain with passive internal or external rotation of the hips.  Good strength to hip flexors, extensors, abductors, and adductors.  Full flexion and extension of the knees.  Full plantarflexion and dorsiflexion of the ankles.  Sensation intact light touch.  Distal neurovascular intact.  Smooth sit to stand transition.  Patient fully weightbearing with nonantalgic gait.    Imaging Results (Most Recent)       Procedure Component Value Units Date/Time    XR Hips Bilateral With or Without Pelvis 3-4 View [[id number]] Resulted: 10/02/23 0908     Updated: 10/02/23 0909    Narrative:      X-Ray Report:  Study: X-rays ordered, taken in the office, and reviewed today.   Site: Bilateral hip Xray  Indication: DELVIS  View: AP/Lateral view(s)  Findings: Intact bilateral total hip " arthroplasties. No evidence of   hardware malfunction, loosening, subsidence or periprosthetic fracture.   Prior studies available for comparison: yes                       Assessment and Plan   Problem List Items Addressed This Visit    None  Visit Diagnoses       Aftercare following bilateral hip joint replacement surgery    -  Primary    Relevant Orders    XR Hips Bilateral With or Without Pelvis 3-4 View (Completed)    History of bilateral total hip arthroplasty              Follow Up   Return in about 1 year (around 10/2/2024).    Tobacco Use: Low Risk     Smoking Tobacco Use: Never    Smokeless Tobacco Use: Never    Passive Exposure: Not on file     Patient is a non-smoker.  Did not discuss tobacco cessation options.    Patient Instructions   X-rays reviewed, showing hardware intact. Continue home exercise program to progress strength and ROM.    Continue with lifelong antibiotic prophylaxis with dental procedures following total joint replacement.    Follow-up in 1 year. Call sooner or return to care, if needed with any changes or concerns. Repeat x-rays.     Patient was given instructions and counseling regarding her condition or for health maintenance advice. Please see specific information pulled into the AVS if appropriate.

## 2024-06-12 ENCOUNTER — HOSPITAL ENCOUNTER (EMERGENCY)
Facility: HOSPITAL | Age: 84
Discharge: HOME | End: 2024-06-12
Attending: EMERGENCY MEDICINE
Payer: MEDICARE

## 2024-06-12 VITALS
HEART RATE: 66 BPM | DIASTOLIC BLOOD PRESSURE: 65 MMHG | RESPIRATION RATE: 16 BRPM | BODY MASS INDEX: 30.43 KG/M2 | WEIGHT: 165.34 LBS | HEIGHT: 62 IN | SYSTOLIC BLOOD PRESSURE: 162 MMHG | OXYGEN SATURATION: 93 % | TEMPERATURE: 98.5 F

## 2024-06-12 DIAGNOSIS — T78.40XA ALLERGIC REACTION, INITIAL ENCOUNTER: Primary | ICD-10-CM

## 2024-06-12 LAB
HOLD SPECIMEN: NORMAL
HOLD SPECIMEN: NORMAL
WHOLE BLOOD HOLD COAG: NORMAL
WHOLE BLOOD HOLD SPECIMEN: NORMAL

## 2024-06-12 PROCEDURE — 96375 TX/PRO/DX INJ NEW DRUG ADDON: CPT

## 2024-06-12 PROCEDURE — 96374 THER/PROPH/DIAG INJ IV PUSH: CPT

## 2024-06-12 PROCEDURE — 25010000002 METHYLPREDNISOLONE PER 125 MG

## 2024-06-12 PROCEDURE — 99283 EMERGENCY DEPT VISIT LOW MDM: CPT

## 2024-06-12 RX ORDER — FAMOTIDINE 10 MG/ML
20 INJECTION, SOLUTION INTRAVENOUS ONCE
Status: COMPLETED | OUTPATIENT
Start: 2024-06-12 | End: 2024-06-12

## 2024-06-12 RX ORDER — FAMOTIDINE 20 MG/1
20 TABLET, FILM COATED ORAL 2 TIMES DAILY
Qty: 30 TABLET | Refills: 0 | Status: SHIPPED | OUTPATIENT
Start: 2024-06-12

## 2024-06-12 RX ADMIN — METHYLPREDNISOLONE SODIUM SUCCINATE 125 MG: 125 INJECTION INTRAMUSCULAR; INTRAVENOUS at 17:20

## 2024-06-12 RX ADMIN — FAMOTIDINE 20 MG: 10 INJECTION INTRAVENOUS at 17:19

## 2024-06-12 NOTE — ED PROVIDER NOTES
Time: 4:53 PM EDT  Date of encounter:  6/12/2024  Independent Historian/Clinical History and Information was obtained by:   Patient    History is limited by: N/A    Chief Complaint   Patient presents with    Allergic Reaction     Patient complains of lip tongue, and throay swelling starting today.  Patient started on Lisinopril Monday.           History of Present Illness:  Patient is a 84 y.o. year old female who presents to the emergency department for evaluation of allergic reaction.  Patient states that she began taking lisinopril on Monday and today she feels that her lip and tongue are swelling.  She also states that she feels her throat is swelling but she is not currently having difficulty swallowing or difficulty breathing.  Patient took Benadryl prior to arrival to the emergency department.  (Provider in triage, Jaswinder Corbin PA-C)    Patient Care Team  Primary Care Provider: Willow Mcknight MD    Past Medical History:     Allergies   Allergen Reactions    Propofol Swelling    Lisinopril Angioedema    Clarithromycin Unknown - Low Severity    Doxylamine Swelling     Past Medical History:   Diagnosis Date    Anemia     Arthritis     Bladder disorder     Depression     High cholesterol     Hyperlipemia     Hypertension     Limb swelling     Seasonal allergies      Past Surgical History:   Procedure Laterality Date    APPENDECTOMY      COLONOSCOPY  1990    ESSURE TUBAL LIGATION      HIP SURGERY      HYSTERECTOMY      JOINT REPLACEMENT      MOUTH SURGERY      OTHER SURGICAL HISTORY      ARTIFICAL JOINTS/ LIMBS     OTHER SURGICAL HISTORY      JOINT SURGERY    TONSILLECTOMY       Family History   Problem Relation Age of Onset    Heart disease Mother     Diabetes Mother     Arthritis Mother     Heart disease Father     Cancer Daughter        Home Medications:  Prior to Admission medications    Medication Sig Start Date End Date Taking? Authorizing Provider   alendronate (FOSAMAX) 70 MG tablet Fosamax 70  mg oral tablet take 1 tablet (70 mg) by oral route once weekly in the morning, at least 30 min before first food, beverage, or medication of day   Active    Ana Maria Perry MD   amLODIPine (NORVASC) 10 MG tablet Norvasc 10 mg oral tablet take 1 tablet (10 mg) by oral route once daily   Active 3/1/21   Ana Maria Perry MD   calcium carbonate (MAALOX) 600 MG chewable tablet Chew 1 tablet.    Ana Maria Perry MD   Calcium Carbonate 1500 (600 Ca) MG tablet Calcium 600 600 mg (1,500 mg) oral tablet take 1 tablet by oral route 2 times a day   Active    Ana Maria Perry MD   cetirizine (zyrTEC) 10 MG tablet Take 1 tablet by mouth Daily for 7 days. 4/14/23 4/21/23  Arpit Mora III, APRN   guaiFENesin (Mucinex) 600 MG 12 hr tablet Take 2 tablets by mouth 2 (Two) Times a Day As Needed for Cough or Congestion for up to 30 doses. 1/9/24   Ana Bowden MD   Lifitegrast (Xiidra) 5 % ophthalmic solution Administer 1 drop to both eyes 2 (Two) Times a Day.    Ana Maria Perry MD   metoprolol tartrate (LOPRESSOR) 50 MG tablet Lopressor 50 mg oral tablet take 1 tablet (50 mg) by oral route 2 times per day with meals   Active    Ana Maria Perry MD   pravastatin (PRAVACHOL) 20 MG tablet pravastatin 20 mg oral tablet take 1 tablet (20 mg) by oral route once daily   Active 3/1/21   Ana Maria Perry MD   vitamin D (ERGOCALCIFEROL) 1.25 MG (05580 UT) capsule capsule TAKE 1 CAPSULE BY MOUTH EVERY MONDAY 4/10/21   Ana Maria Perry MD        Social History:   Social History     Tobacco Use    Smoking status: Never    Smokeless tobacco: Never   Vaping Use    Vaping status: Never Used   Substance Use Topics    Alcohol use: Never    Drug use: Never         Review of Systems:  Review of Systems   Constitutional:  Negative for chills and fever.   HENT:  Negative for congestion, rhinorrhea and sore throat.    Eyes:  Negative for pain and visual disturbance.   Respiratory:  Negative for  "apnea, cough, chest tightness and shortness of breath.    Cardiovascular:  Negative for chest pain and palpitations.   Gastrointestinal:  Negative for abdominal pain, diarrhea, nausea and vomiting.   Genitourinary:  Negative for difficulty urinating and dysuria.   Musculoskeletal:  Negative for joint swelling and myalgias.   Skin:  Negative for color change.   Neurological:  Negative for seizures and headaches.   Psychiatric/Behavioral: Negative.     All other systems reviewed and are negative.       Physical Exam:  /70   Pulse 60   Temp 98.5 °F (36.9 °C)   Resp 16   Ht 157.5 cm (62\")   Wt 75 kg (165 lb 5.5 oz)   LMP  (LMP Unknown)   SpO2 90%   BMI 30.24 kg/m²         Physical Exam  Vitals and nursing note reviewed.   Constitutional:       General: She is not in acute distress.     Appearance: Normal appearance. She is not toxic-appearing.   HENT:      Head: Normocephalic and atraumatic.      Jaw: There is normal jaw occlusion.      Mouth/Throat:      Mouth: Mucous membranes are moist.   Eyes:      General: Lids are normal.      Extraocular Movements: Extraocular movements intact.      Conjunctiva/sclera: Conjunctivae normal.      Pupils: Pupils are equal, round, and reactive to light.   Cardiovascular:      Rate and Rhythm: Normal rate and regular rhythm.      Pulses: Normal pulses.      Heart sounds: Normal heart sounds.   Pulmonary:      Effort: Pulmonary effort is normal. No respiratory distress.      Breath sounds: Normal breath sounds. No wheezing or rhonchi.   Abdominal:      General: Abdomen is flat. There is no distension.      Palpations: Abdomen is soft.      Tenderness: There is no abdominal tenderness. There is no guarding or rebound.   Musculoskeletal:         General: Normal range of motion.      Cervical back: Normal range of motion and neck supple.      Right lower leg: No edema.      Left lower leg: No edema.   Skin:     General: Skin is warm and dry.   Neurological:      General: No " focal deficit present.      Mental Status: She is alert and oriented to person, place, and time. Mental status is at baseline.   Psychiatric:         Mood and Affect: Mood normal.         Behavior: Behavior normal.                    Procedures:  Procedures      Medical Decision Making:      Comorbidities that affect care:    Hypertension    External Notes reviewed:    Previous Clinic Note: Patient was last seen in clinic for cough.      The following orders were placed and all results were independently analyzed by me:  Orders Placed This Encounter   Procedures    [place] Draw    Green Top (Gel)    Lavender Top    Gold Top - SST    Light Blue Top       Medications Given in the Emergency Department:  Medications   famotidine (PEPCID) injection 20 mg (20 mg Intravenous Given 6/12/24 1719)   methylPREDNISolone sodium succinate (SOLU-Medrol) 125 mg in sterile water (preservative free) 2 mL (125 mg Intravenous Given 6/12/24 1720)        ED Course:    The patient was initially evaluated in the triage area where orders were placed. The patient was later dispositioned by Jana Randle MD.      The patient was advised to stay for completion of workup which includes but is not limited to communication of labs and radiological results, reassessment and plan. The patient was advised that leaving prior to disposition by a provider could result in critical findings that are not communicated to the patient.     ED Course as of 06/12/24 1953 Wed Jun 12, 2024   1652 PROVIDER IN TRIAGE  Patient was evaluated by me in triage, Jaswinder Corbin PA-C.  Orders were placed and patient is currently awaiting final results and disposition.  [MD]      ED Course User Index  [MD] Jaswinder Corbin PA-C       Labs:    Lab Results (last 24 hours)       ** No results found for the last 24 hours. **             Imaging:    No Radiology Exams Resulted Within Past 24 Hours      Differential Diagnosis and Discussion:      Allergic Reaction:  Differential diagnosis includes but is not limited to drug side effects, contact dermatitis, autoimmune conditions, infections, mast cell disorders, serum sickness, anaphylactoid reactions, angioedema, panic or anxiety attacks.        MDM     The patient was monitored in the ED for 3 hours. The patient reports significant relief of their symptoms. The patient denies shortness of breath, tongue and neck swelling, or altered mental status. The patient has no respiratory distress, hypoxia, and has stable and suitable vital signs for discharge. The patient was counseled to follow up with a primary care physician in 2-3 days. The patient was given a prescription for benadryl, pepcid, and a steroid. The patient was counseled to return to the ED for any worsening of their symptoms or for any reassessment that they may need. Patient was counseled to return especially for shortness of breath, neck and tongue swelling, chest pain, respiratory difficulty, uncontrollable fever, or altered mental status.          Patient Care Considerations:    EPINEPHRINE: I considered giving epinephrine, however the patient has no respiratory distress, stridor and improved with treatment.      Consultants/Shared Management Plan:    None    Social Determinants of Health:    Patient is independent, reliable, and has access to care.       Disposition and Care Coordination:    Discharged: I considered escalation of care by admitting this patient to the hospital, however patient has no respiratory distress, hypoxia and is much improved with ED treatment.    I have explained the patient´s condition, diagnoses and treatment plan based on the information available to me at this time. I have answered questions and addressed any concerns. The patient has a good  understanding of the patient´s diagnosis, condition, and treatment plan as can be expected at this point. The vital signs have been stable. The patient´s condition is stable and appropriate for  discharge from the emergency department.      The patient will pursue further outpatient evaluation with the primary care physician or other designated or consulting physician as outlined in the discharge instructions. They are agreeable to this plan of care and follow-up instructions have been explained in detail. The patient has received these instructions in written format and has expressed an understanding of the discharge instructions. The patient is aware that any significant change in condition or worsening of symptoms should prompt an immediate return to this or the closest emergency department or call to 1.  I have explained discharge medications and the need for follow up with the patient/caretakers. This was also printed in the discharge instructions. Patient was discharged with the following medications and follow up:      Medication List        New Prescriptions      famotidine 20 MG tablet  Commonly known as: PEPCID  Take 1 tablet by mouth 2 (Two) Times a Day.               Where to Get Your Medications        These medications were sent to [id number] - [place], [place] - [address] AT [facility] [phone] [facility] [phone]   [address] [address]      Phone: [phone]   famotidine 20 MG tablet      Willow Mcknight MD  [facility] [place] [address] [address] [phone]    In 2 days         Final diagnoses:   Allergic reaction, initial encounter        ED Disposition       ED Disposition   Discharge    Condition   Stable    Comment   --               This medical record created using voice recognition software.             Jana Randle MD  06/12/24 1953

## 2024-06-13 ENCOUNTER — TRANSCRIBE ORDERS (OUTPATIENT)
Dept: ADMINISTRATIVE | Facility: HOSPITAL | Age: 84
End: 2024-06-13
Payer: MEDICARE

## 2024-06-13 DIAGNOSIS — M81.0 SENILE OSTEOPOROSIS: Primary | ICD-10-CM

## 2024-07-26 ENCOUNTER — HOSPITAL ENCOUNTER (OUTPATIENT)
Dept: BONE DENSITY | Facility: HOSPITAL | Age: 84
Discharge: HOME | End: 2024-07-26
Payer: MEDICARE

## 2024-07-26 DIAGNOSIS — M81.0 SENILE OSTEOPOROSIS: ICD-10-CM

## 2024-07-26 PROCEDURE — 77080 DXA BONE DENSITY AXIAL: CPT

## 2024-10-09 ENCOUNTER — OFFICE VISIT (OUTPATIENT)
Dept: ORTHOPEDIC SURGERY | Facility: CLINIC | Age: 84
End: 2024-10-09
Payer: MEDICARE

## 2024-10-09 VITALS
OXYGEN SATURATION: 92 % | BODY MASS INDEX: 29.1 KG/M2 | SYSTOLIC BLOOD PRESSURE: 177 MMHG | WEIGHT: 164.24 LBS | DIASTOLIC BLOOD PRESSURE: 74 MMHG | HEIGHT: 63 IN | HEART RATE: 73 BPM

## 2024-10-09 DIAGNOSIS — Z96.643 AFTERCARE FOLLOWING BILATERAL HIP JOINT REPLACEMENT SURGERY: Primary | ICD-10-CM

## 2024-10-09 DIAGNOSIS — Z96.643 HISTORY OF BILATERAL TOTAL HIP ARTHROPLASTY: ICD-10-CM

## 2024-10-09 DIAGNOSIS — Z47.1 AFTERCARE FOLLOWING BILATERAL HIP JOINT REPLACEMENT SURGERY: Primary | ICD-10-CM

## 2024-10-09 PROCEDURE — 99213 OFFICE O/P EST LOW 20 MIN: CPT

## 2024-10-09 PROCEDURE — 1160F RVW MEDS BY RX/DR IN RCRD: CPT

## 2024-10-09 PROCEDURE — 1159F MED LIST DOCD IN RCRD: CPT

## 2024-10-09 RX ORDER — LOSARTAN POTASSIUM AND HYDROCHLOROTHIAZIDE 12.5; 1 MG/1; MG/1
1 TABLET ORAL DAILY
COMMUNITY
Start: 2024-09-17

## 2024-10-09 NOTE — PROGRESS NOTES
"Chief Complaint  Follow-up of the Right Hip and Follow-up of the Left Hip    Subjective      Jayne Crenshaw presents to [facility] for follow up of her bilateral hips.  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 Tabares.  Patient reports she is doing very well.  She states that she has not had any falls or injuries.  She denies any concerns today.  She denies any pain, numbness or tingling to the hips.  Overall she is very pleased with her progress.    Allergies   Allergen Reactions    Propofol Swelling    Lisinopril Angioedema    Clarithromycin Unknown - Low Severity and Other (See Comments)    Doxylamine Swelling       Objective     Vital Signs:   Vitals:    10/09/24 1356   BP: 177/74   Pulse: 73   SpO2: 92%   Weight: 74.5 kg (164 lb 3.9 oz)   Height: 160 cm (63\")     Body mass index is 29.09 kg/m².    I reviewed the patient's chief complaint, history of present illness, review of systems, past medical history, surgical history, family history, social history, medications, and allergy list.     REVIEW OF SYSTEMS    Constitutional: Denies fevers, chills, weight loss  Cardiovascular: Denies chest pain, shortness of breath  Skin: Denies rashes, acute skin changes  Neurologic: Denies headache, loss of consciousness  MSK: Bilateral hip pain.     Ortho Exam  Hip   General: Alert. No acute distress.  Gait: Nonantalgic gait.    Bilateral hip: Incisions well-healed.  No pain with passive hip ROM.  Intact active hip ROM with good strength and no pain. Non tender over the thigh or knee distally. Demonstrates intact active ankle dorsiflexion and plantarflexion. Demonstrates intact sensation over dorsal and plantar foot.  Calf soft, nontender. Neurovascular intact distally. Palpable pedal pulses.           Imaging Results (Most Recent)       Procedure Component Value Units Date/Time    XR Hip With or Without Pelvis " 2 - 3 View Left [[id number]] Resulted: 10/09/24 1413     Updated: 10/09/24 1413    Narrative:      X-Ray Report:  Study: X-rays ordered, taken in the office, and reviewed today.   Site: Left Hip Xray  Indication: Left total hip arthroplasty follow-up  View: AP, frog lateral left hip  Findings: Intact left total hip arthroplasty.  No evidence of hardware   complications or loosening.  No periprosthetic fractures are visualized.   Prior studies available for comparison: yes        XR Hip With or Without Pelvis 2 - 3 View Right [[id number]] Resulted: 10/09/24 1412     Updated: 10/09/24 1413    Narrative:      X-Ray Report:  Study: X-rays ordered, taken in the office, and reviewed today.   Site: Right Hip Xray  Indication: Right total hip arthroplasty follow-up  View: AP, frog lateral right hip  Findings: Intact right total hip arthroplasty.  No evidence of hardware   complications or loosening.  No periprosthetic fractures are visualized.    Prior studies available for comparison: yes                    Assessment and Plan   Diagnoses and all orders for this visit:    1. Aftercare following bilateral hip joint replacement surgery (Primary)  -     XR Hip With or Without Pelvis 2 - 3 View Left  -     XR Hip With or Without Pelvis 2 - 3 View Right    2. History of bilateral total hip arthroplasty         Jayne Crenshaw is status post right total hip arthroplasty performed on 3/26/2019 and left total hip arthroplasty performed on 3/30/2021 by Dr. Tabares.  X-rays reviewed, showing hardware intact and no complications.  Patient is doing well. Continue home exercise program to progress strength and ROM.     Discussed the importance of lifelong antibiotic prophylaxis with dental procedures following total joint replacement. In the event of a dental procedure, patient is welcome to contact our office to obtain antibiotics prior to the procedure if their dentist does not provide the prescription. Patient verbalized  understanding.     Follow-up in 1 year. We will repeat xray's of the prosthetic joint at that time.   Call sooner or return to care, if needed with any changes or concerns.            Follow Up   Return in about 1 year (around 10/9/2025).  There are no Patient Instructions on file for this visit.  Patient was given instructions and counseling regarding her condition or for health maintenance advice. Please see specific information pulled into the AVS if appropriate.       Dictated Utilizing Dragon Dictation. Please note that portions of this note were completed with a voice recognition program. Part of this note may be an electronic transcription/translation of spoken language to printed text using the Dragon Dictation System.

## 2025-08-13 ENCOUNTER — OFFICE VISIT (OUTPATIENT)
Dept: ORTHOPEDIC SURGERY | Facility: CLINIC | Age: 85
End: 2025-08-13
Payer: MEDICARE

## 2025-08-13 VITALS
HEART RATE: 64 BPM | HEIGHT: 62 IN | OXYGEN SATURATION: 93 % | DIASTOLIC BLOOD PRESSURE: 90 MMHG | BODY MASS INDEX: 30.91 KG/M2 | SYSTOLIC BLOOD PRESSURE: 207 MMHG | WEIGHT: 168 LBS

## 2025-08-13 DIAGNOSIS — M75.81 ROTATOR CUFF TENDONITIS, RIGHT: ICD-10-CM

## 2025-08-13 DIAGNOSIS — M25.511 RIGHT SHOULDER PAIN, UNSPECIFIED CHRONICITY: Primary | ICD-10-CM

## 2025-08-13 RX ORDER — LIDOCAINE HYDROCHLORIDE 10 MG/ML
5 INJECTION, SOLUTION INFILTRATION; PERINEURAL
Status: COMPLETED | OUTPATIENT
Start: 2025-08-13 | End: 2025-08-13

## 2025-08-13 RX ORDER — TRIAMCINOLONE ACETONIDE 40 MG/ML
40 INJECTION, SUSPENSION INTRA-ARTICULAR; INTRAMUSCULAR
Status: COMPLETED | OUTPATIENT
Start: 2025-08-13 | End: 2025-08-13

## 2025-08-13 RX ADMIN — TRIAMCINOLONE ACETONIDE 40 MG: 40 INJECTION, SUSPENSION INTRA-ARTICULAR; INTRAMUSCULAR at 11:05

## 2025-08-13 RX ADMIN — LIDOCAINE HYDROCHLORIDE 5 ML: 10 INJECTION, SOLUTION INFILTRATION; PERINEURAL at 11:05
